# Patient Record
Sex: MALE | Race: WHITE | HISPANIC OR LATINO | Employment: UNEMPLOYED | URBAN - METROPOLITAN AREA
[De-identification: names, ages, dates, MRNs, and addresses within clinical notes are randomized per-mention and may not be internally consistent; named-entity substitution may affect disease eponyms.]

---
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
## 2019-10-23 ENCOUNTER — APPOINTMENT (OUTPATIENT)
Dept: RADIOLOGY | Facility: MEDICAL CENTER | Age: 19
End: 2019-10-23
Attending: EMERGENCY MEDICINE
Payer: COMMERCIAL

## 2019-10-23 ENCOUNTER — HOSPITAL ENCOUNTER (EMERGENCY)
Facility: MEDICAL CENTER | Age: 19
End: 2019-10-23
Attending: EMERGENCY MEDICINE
Payer: COMMERCIAL

## 2019-10-23 VITALS
HEART RATE: 62 BPM | DIASTOLIC BLOOD PRESSURE: 66 MMHG | TEMPERATURE: 98.1 F | OXYGEN SATURATION: 99 % | BODY MASS INDEX: 21.71 KG/M2 | HEIGHT: 72 IN | SYSTOLIC BLOOD PRESSURE: 112 MMHG | WEIGHT: 160.27 LBS | RESPIRATION RATE: 18 BRPM

## 2019-10-23 DIAGNOSIS — F29 PSYCHOSIS, UNSPECIFIED PSYCHOSIS TYPE (HCC): ICD-10-CM

## 2019-10-23 DIAGNOSIS — R41.82 ALTERED MENTAL STATUS, UNSPECIFIED ALTERED MENTAL STATUS TYPE: ICD-10-CM

## 2019-10-23 LAB
ALBUMIN SERPL BCP-MCNC: 4.3 G/DL (ref 3.2–4.9)
ALBUMIN/GLOB SERPL: 1.4 G/DL
ALP SERPL-CCNC: 78 U/L (ref 30–99)
ALT SERPL-CCNC: 17 U/L (ref 2–50)
AMPHET UR QL SCN: NEGATIVE
ANION GAP SERPL CALC-SCNC: 10 MMOL/L (ref 0–11.9)
APAP SERPL-MCNC: <10 UG/ML (ref 10–30)
APPEARANCE UR: CLEAR
AST SERPL-CCNC: 17 U/L (ref 12–45)
BARBITURATES UR QL SCN: NEGATIVE
BASOPHILS # BLD AUTO: 0.7 % (ref 0–1.8)
BASOPHILS # BLD: 0.06 K/UL (ref 0–0.12)
BENZODIAZ UR QL SCN: NEGATIVE
BILIRUB SERPL-MCNC: 0.3 MG/DL (ref 0.1–1.5)
BILIRUB UR QL STRIP.AUTO: NEGATIVE
BUN SERPL-MCNC: 19 MG/DL (ref 8–22)
BZE UR QL SCN: NEGATIVE
CALCIUM SERPL-MCNC: 9 MG/DL (ref 8.5–10.5)
CANNABINOIDS UR QL SCN: NEGATIVE
CHLORIDE SERPL-SCNC: 105 MMOL/L (ref 96–112)
CO2 SERPL-SCNC: 26 MMOL/L (ref 20–33)
COLOR UR: YELLOW
CREAT SERPL-MCNC: 0.8 MG/DL (ref 0.5–1.4)
EKG IMPRESSION: NORMAL
EOSINOPHIL # BLD AUTO: 0.13 K/UL (ref 0–0.51)
EOSINOPHIL NFR BLD: 1.6 % (ref 0–6.9)
ERYTHROCYTE [DISTWIDTH] IN BLOOD BY AUTOMATED COUNT: 41.1 FL (ref 35.9–50)
ETHANOL BLD-MCNC: 0 G/DL
GLOBULIN SER CALC-MCNC: 3.1 G/DL (ref 1.9–3.5)
GLUCOSE BLD-MCNC: 91 MG/DL (ref 65–99)
GLUCOSE SERPL-MCNC: 86 MG/DL (ref 65–99)
GLUCOSE UR STRIP.AUTO-MCNC: NEGATIVE MG/DL
HCT VFR BLD AUTO: 43.4 % (ref 42–52)
HGB BLD-MCNC: 14.4 G/DL (ref 14–18)
IMM GRANULOCYTES # BLD AUTO: 0.02 K/UL (ref 0–0.11)
IMM GRANULOCYTES NFR BLD AUTO: 0.2 % (ref 0–0.9)
KETONES UR STRIP.AUTO-MCNC: NEGATIVE MG/DL
LEUKOCYTE ESTERASE UR QL STRIP.AUTO: NEGATIVE
LYMPHOCYTES # BLD AUTO: 2.65 K/UL (ref 1–4.8)
LYMPHOCYTES NFR BLD: 33 % (ref 22–41)
MCH RBC QN AUTO: 29.3 PG (ref 27–33)
MCHC RBC AUTO-ENTMCNC: 33.2 G/DL (ref 33.7–35.3)
MCV RBC AUTO: 88.2 FL (ref 81.4–97.8)
METHADONE UR QL SCN: NEGATIVE
MICRO URNS: NORMAL
MONOCYTES # BLD AUTO: 0.44 K/UL (ref 0–0.85)
MONOCYTES NFR BLD AUTO: 5.5 % (ref 0–13.4)
NEUTROPHILS # BLD AUTO: 4.74 K/UL (ref 1.82–7.42)
NEUTROPHILS NFR BLD: 59 % (ref 44–72)
NITRITE UR QL STRIP.AUTO: NEGATIVE
NRBC # BLD AUTO: 0 K/UL
NRBC BLD-RTO: 0 /100 WBC
OPIATES UR QL SCN: NEGATIVE
OXYCODONE UR QL SCN: NEGATIVE
PCP UR QL SCN: NEGATIVE
PH UR STRIP.AUTO: 6 [PH] (ref 5–8)
PLATELET # BLD AUTO: 337 K/UL (ref 164–446)
PMV BLD AUTO: 9.5 FL (ref 9–12.9)
POTASSIUM SERPL-SCNC: 3.4 MMOL/L (ref 3.6–5.5)
PROPOXYPH UR QL SCN: NEGATIVE
PROT SERPL-MCNC: 7.4 G/DL (ref 6–8.2)
PROT UR QL STRIP: NEGATIVE MG/DL
RBC # BLD AUTO: 4.92 M/UL (ref 4.7–6.1)
RBC UR QL AUTO: NEGATIVE
SALICYLATES SERPL-MCNC: 0 MG/DL (ref 15–25)
SODIUM SERPL-SCNC: 141 MMOL/L (ref 135–145)
SP GR UR STRIP.AUTO: 1.02
T4 FREE SERPL-MCNC: 0.99 NG/DL (ref 0.53–1.43)
TSH SERPL DL<=0.005 MIU/L-ACNC: 0.76 UIU/ML (ref 0.38–5.33)
UROBILINOGEN UR STRIP.AUTO-MCNC: 0.2 MG/DL
WBC # BLD AUTO: 8 K/UL (ref 4.8–10.8)

## 2019-10-23 PROCEDURE — 80053 COMPREHEN METABOLIC PANEL: CPT

## 2019-10-23 PROCEDURE — 80307 DRUG TEST PRSMV CHEM ANLYZR: CPT

## 2019-10-23 PROCEDURE — 84443 ASSAY THYROID STIM HORMONE: CPT

## 2019-10-23 PROCEDURE — 70450 CT HEAD/BRAIN W/O DYE: CPT

## 2019-10-23 PROCEDURE — 99285 EMERGENCY DEPT VISIT HI MDM: CPT

## 2019-10-23 PROCEDURE — 81003 URINALYSIS AUTO W/O SCOPE: CPT

## 2019-10-23 PROCEDURE — 93005 ELECTROCARDIOGRAM TRACING: CPT | Performed by: EMERGENCY MEDICINE

## 2019-10-23 PROCEDURE — 84439 ASSAY OF FREE THYROXINE: CPT

## 2019-10-23 PROCEDURE — 82962 GLUCOSE BLOOD TEST: CPT

## 2019-10-23 PROCEDURE — 85025 COMPLETE CBC W/AUTO DIFF WBC: CPT

## 2019-10-23 NOTE — ED NOTES
Received report from Rachna FAY. Pt care responsibilities assumed. Pt resting in bed, will continue to monitor.

## 2019-10-23 NOTE — CONSULTS
"[facility]   TRIAGE ASSESSMENT    Name: Boaz Reis  MRN: [id number]  : 2000  Age: 18 y.o.  Date of assessment: 10/23/2019  PCP: No primary care provider on file.  Persons in attendance: Patient    CHIEF COMPLAINT/PRESENTING ISSUE (as stated by patient): Patient lying in bed staring at ceiling.  Nodding, smiling and speaking under his breath.  Patient's feet malodorous with multiple wounds observed.  Labile mood, alternating between smiling, laughing and then becoming tearful.  No anger or aggression noted.  Latency and poverty of speech observed. Answers questions with two to three word responses. Endorses auditory hallucinations.  Reports one voice being present. Patient unable to provide much information due to psychosis.  To remain on legal hold at this time.     Chief Complaint   Patient presents with   • ALOC     Pt presents with an ALOC, brought in by his brother. Pt is responds minimally to converstation. Does not make appropriate eye contact. Pt laughing at inapropriate times, and appeared to be reacting to internal stimuli. Pt admits to hearing voices. States that voice in \"pretty friendly\" denies command hallucinations, at this time. Pt denies psyciatric history.  Pt currently denying drug use.    • Family Problem     Pt brother was called to come pick his brother up from his fathers. Pt has not been in contact with brother in 1 year.         CURRENT LIVING SITUATION/SOCIAL SUPPORT: Patient able to provide address of residence.  Lives with father; however, unable to provide any further details.  Per triage note, patient brought in by brother.  Unknown social support other than brother and father at this time.      BEHAVIORAL HEALTH TREATMENT HISTORY  Does patient/parent report a history of prior behavioral health treatment for patient?   No:  Patient denies behavioral health treatment history.  Denies taking psychiatric medications.      SAFETY ASSESSMENT - SELF  Does patient " "acknowledge current or past symptoms of dangerousness to self? no  Does parent/significant other report patient has current or past symptoms of dangerousness to self? N\A  Does presenting problem suggest symptoms of dangerousness to self? No- however, patient unable to take care of self at this time.      SAFETY ASSESSMENT - OTHERS  Does patient acknowledge current or past symptoms of aggressive behavior or risk to others? no  Does parent/significant other report patient has current or past symptoms of aggressive behavior or risk to others?  N\A  Does presenting problem suggest symptoms of dangerousness to others? No    Crisis Safety Plan completed and copy given to patient? no    ABUSE/NEGLECT SCREENING  Does patient report feeling “unsafe” in his/her home, or afraid of anyone?  no  Does patient report any history of physical, sexual, or emotional abuse?  no  Does parent or significant other report any of the above? N\A  Is there evidence of neglect by self?  no  Is there evidence of neglect by a caregiver? no  Does the patient/parent report any history of CPS/APS/police involvement related to suspected abuse/neglect or domestic violence? no  Based on the information provided during the current assessment, is a mandated report of suspected abuse/neglect being made?  No    SUBSTANCE USE SCREENING  Yes:  Arjan all substances used in the past 30 days: Denies      Last Use Amount   []   Alcohol     []   Marijuana     []   Heroin     []   Prescription Opioids  (used without prescription, for    recreation, or in excess of prescribed amount)     []   Other Prescription  (used without prescription, for    recreation, or in excess of prescribed amount)     []   Cocaine      []   Methamphetamine     []   \"\" drugs (ectasy, MDMA)     []   Other substances        UDS results: Pending  Breathalyzer results: 0.0    What consequences does the patient associate with any of the above substance use and or addictive behaviors? " None    Risk factors for detox (check all that apply):  []  Seizures   []  Diaphoretic (sweating)   []  Tremors   []  Hallucinations   []  Increased blood pressure   []  Decreased blood pressure   []  Other   []  None      [] Patient education on risk factors for detoxification and instructed to return to ER as needed.      MENTAL STATUS   Participation: Limited verbal participation  Grooming: Disheveled  Orientation: Alert and Evidence of hallucinations present  Behavior: Unusual behaviors noted  Eye contact: Poor  Mood: Labile  Affect: Labile -between happy and sad  Thought process: unable to assess  Thought content: Preoccupation-auditory hallucinations  Speech: Soft, Hypotalkative and Latency of response  Perception: Evidence of auditory hallucination  Memory:  Poor memory for chronology of events  Insight: Poor  Judgment:  Poor  Other:    Collateral information:   Source:  [] Significant other present in person:   [] Significant other by telephone  [] [facility]   [x] [facility] Nursing Staff  [x] [facility]  [x] Other: ERP    [x] Unable to complete full assessment due to:  [] Acute intoxication  [] Patient declined to participate/engage  [] Patient verbally unresponsive  [] Significant cognitive deficits  [x] Significant perceptual distortions or behavioral disorganization  [] Other:      CLINICAL IMPRESSIONS:  Primary:  Psychosis  Secondary:         IDENTIFIED NEEDS/PLAN:  [Trigger DISPOSITION list for any items marked]    []  Imminent safety risk - self [] Imminent safety risk - others   []  Acute substance withdrawal [x]  Psychosis/Impaired reality testing   []  Mood/anxiety []  Substance use/Addictive behavior   []  Maladaptive behaviro []  Parent/child conflict   []  Family/Couples conflict []  Biomedical   []  Housing []  Financial   []   Legal  Occupational/Educational   []  Domestic violence []  Other:     Disposition: Refer to Legal Hold    Does patient express agreement with the above  plan? no    Referral appointment(s) scheduled? N\A    Alert team only:   I have discussed findings and recommendations with Dr. Cruz who is in agreement with these recommendations.     Referral information sent to the following community providers :    If applicable : Referred  to : [facility] for legal hold follow up at (time): Pending ERP certification      Edmund Garcai R.N.  10/23/2019

## 2019-10-23 NOTE — ED TRIAGE NOTES
"Boaz Reis  Chief Complaint   Patient presents with   • ALOC     Pt presents with an ALOC, brought in by his brother. Pt is responds minimally to converstation. Does not make appropriate eye contact. Pt laughing at inapropriate times, and appeared to be reacting to internal stimuli. Pt admits to hearing voices. States that voice in \"pretty friendly\" denies command hallucinations, at this time. Pt denies psyciatric history.  Pt currently denying drug use.    • Family Problem     Pt brother was called to come pick his brother up from his fathers. Pt has not been in contact with brother in 1 year.      Pt ambulatory to triage with above complaint.     /64   Pulse 74   Temp 36.8 °C (98.2 °F) (Temporal)   Resp 16   Ht 1.829 m (6')   Wt 72.7 kg (160 lb 4.4 oz)   SpO2 96%   BMI 21.74 kg/m²       "

## 2019-10-23 NOTE — DISCHARGE PLANNING
Medical Social Work    Referral: Legal hold Transfer to Mental Health Facility    Intervention: LIZ received call from Rip at [facility] stating that they have accepted the patient for admission.     Pt's accepting physcian is Dr. Loyda HILL arranged for transportation to be set up [facility]    The pt will be picked up at 1000.     LIZ notified the RN of the departure time as well as accepting facility.     LIZ created transfer packet and placed on chart.     Plan: Pt will transfer back to [facility] at 1000

## 2019-10-23 NOTE — ED PROVIDER NOTES
"                                                                          ED Provider Note    Scribed for Darion Cruz M.D. by Vicenta Pal. 10/23/2019  2:17 AM    CHIEF COMPLAINT  Chief Complaint   Patient presents with   • ALOC     Pt presents with an ALOC, brought in by his brother. Pt is responds minimally to converstation. Does not make appropriate eye contact. Pt laughing at inapropriate times, and appeared to be reacting to internal stimuli. Pt admits to hearing voices. States that voice in \"pretty friendly\" denies command hallucinations, at this time. Pt denies psyciatric history.  Pt currently denying drug use.    • Family Problem     Pt brother was called to come pick his brother up from his fathers. Pt has not been in contact with brother in 1 year.        HPI  Boaz Reis is a 18 y.o. male who presents to the ED for psych evalaution. Per nursing note, Patient's mother states that patient has been \"out of the norm\" lately, staring off into space for long periods of time. She states that patient becomes unresponsive and doesn't talk as much. Patient does not make appropriate eye contact, laughs at inappropriate times, and appears to react to internal stimuli. Patient admits hearing voices, which are \"friendly.\" Denies following commands of these voices. Denies drug use. Patient has no prior medical history or psychological history.    Patient has not been in contact with his brother in 1 year. According to patient's brother, patient has been \"out on the streets for a few days.\" He was taken to [facility], but was sent to the ED for further evaluation before admitting.     Unobtainable HPI limited secondary to ALOC    REVIEW OF SYSTEMS  Constitutional: No fevers, chills, or recent illness.  Skin: No rashes or diaphoresis.  Eyes: No change in vision, no discharge.  ENT: No hearing change. No rhinorrhea or nasal congestion, no ST or difficulty swallowing.  Respiratory: No SOB. No coughing or " hemoptysis. No Wheezing.  Cardiac: No CP, palpitations, edema. No PND or orthopnea.  GI: No Abdominal Pain; N/V; diarrhea, constipation. No blood in stool.  : No dysuria. No D/C. No frequency or urgency. No hesitancy.   MSK: No pain in joints or muscles. No calf pain or swelling.  Neuro: No HA or paresthesias. No focal weakness.  Psych: No SI, HI, AH, VH.  Endocrine: No polyuria or polydipsia. No heat or cold intolerance.  Heme: No easy bruising. No history of bleeding disorders or anemia.    Unobtainable ROS limited secondary to ALOC    PAST MEDICAL HISTORY       SOCIAL HISTORY  Social History     Tobacco Use   • Smoking status: Current Some Day Smoker     Types: Cigarettes   • Smokeless tobacco: Never Used   Substance and Sexual Activity   • Alcohol use: Yes     Comment: occ   • Drug use: No   • Sexual activity: Never       SURGICAL HISTORY  patient denies any surgical history    CURRENT MEDICATIONS  No current facility-administered medications on file prior to encounter.      No current outpatient medications on file prior to encounter.     ALLERGIES  No Known Allergies    PHYSICAL EXAM  VITAL SIGNS: /64   Pulse 74   Temp 36.8 °C (98.2 °F) (Temporal)   Resp 16   Ht 1.829 m (6')   Wt 72.7 kg (160 lb 4.4 oz)   SpO2 96%   BMI 21.74 kg/m²    Pulse ox interpretation: I interpret this pulse ox as normal.  Genl: Disheveled appearing young male, nontoxic.   Head: NC/AT   ENT: Dry MM,posterior pharynx clear, uvula midline, nares patent bilaterally   Eyes: Normal sclera, pupils equal round reactive to light  Neck: Supple, FROM, no LAD appreciated   Pulmonary: Lungs are clear to auscultation bilaterally  Chest: No TTP  CV:  RRR, no murmur appreciated, pulses 2+ in both upper and lower extremities,  Abdomen: soft, NT/ND; no rebound/guarding, no masses palpated, no HSM   : no CVA or suprapubic tenderness   Musculoskeletal: Pain free ROM of the neck. Moving upper and lower extremities and spontaneous in  coordinated fashion  Neuro: A&Ox4 (person, place, time, situation), speech fluent, gait steady, no focal deficits appreciated, No cerebellar signs  Sensation is grossly intact in the distal upper and lower extremities  5/5 strength in  and dorsiflexion/plantar flexion of the ankles  Psych: Alert and oriented. Non linear thought process. Quiet speech. Intermittent and inappropriate laugher. Responds to internal stimuli. Psychomotor agitation of upper extremities. Consistent head nodding and blinking.  Skin:weathered, tanned, and wet skin break on bilateral feet.     DIAGNOSTIC STUDIES / PROCEDURES    EKG  Results for orders placed or performed during the hospital encounter of 10/23/19   EKG (NOW)   Result Value Ref Range    Report       [facility] Emergency Dept[facility]    Test Date:  2019-10-23  Pt Name:    KEVIN RIZO                 Department: ER  MRN:        [id number]                      Room:       [facility]  Gender:     Male                         Technician: [id number]  :        2000                   Requested By:MANNIE MIKE  Order #:    [id number]                    Reading MD: Anthony Orlando MD    Measurements  Intervals                                Axis  Rate:       54                           P:          47  ND:         140                          QRS:        84  QRSD:       100                          T:          56  QT:         444  QTc:        421    Interpretive Statements  SINUS BRADYCARDIA  PROBABLE LEFT VENTRICULAR HYPERTROPHY  No previous ECG available for comparison    Electronically Signed On 10- 2:56:40 PDT by Anthony Orlando MD           LABS  Labs Reviewed   CBC WITH DIFFERENTIAL - Abnormal; Notable for the following components:       Result Value    MCHC 33.2 (*)     All other components within normal limits   COMP METABOLIC PANEL - Abnormal; Notable for the following components:    Potassium 3.4 (*)     All other components within normal limits   SALICYLATE -  Abnormal; Notable for the following components:    Salicylates, Quant. 0 (*)     All other components within normal limits   DIAGNOSTIC ALCOHOL   ACETAMINOPHEN   URINALYSIS,CULTURE IF INDICATED   URINE DRUG SCREEN   TSH   FREE THYROXINE   ESTIMATED GFR   ACCU-CHEK GLUCOSE     All labs reviewed by me.    RADIOLOGY  CT-HEAD W/O   Final Result         1.  No acute intracranial abnormality.   2.  Severe bilateral maxillary sinusitis changes      Radiology reviewed by me     COURSE & MEDICAL DECISION MAKING  Pertinent Labs & Imaging studies reviewed. (See chart for details)    Differential diagnoses include but not limited to:   Psychiatric illness  Psychosis  Intercranial abnormality  Cranial Mass  Thyroid Dysfunction  Hypo/hyper gylcemia   Electrolyte abnormality  Substance abuse  Alcohol intoxication.     2:17 AM - Patient seen and examined at bedside. Patient will be treated with IV fluids. CT-head, Urinalysis, Urine drug screen, TSH, Free thyroxine, blood alcohol, CBC with diff., CMP, Acetaminophen Level, Salicylate level, EKG to evaluate his symptoms.     HYDRATION: Based on the patient's presentation of Dehydration the patient was given IV fluids. IV Hydration was used because oral hydration was not adequate alone. Upon recheck following hydration, the patient was improved.    Medical Decision Making:   Presented to the emergency room for symptoms as described above.  On my initial evaluation he appears to be suffering from an acute psychosis and based on his age and family history schizophrenia is the likely leading diagnosis.  Bedside blood sugar is normal.  As he has not been previously evaluated a broad medical work-up with the broad differential as noted above is considered.  He has no evidence of hypo-or hyperthyroidism, he has no focal neurological deficits or signs of increased intracranial pressure.  CT of the head is ordered to rule out any possible masses or other structural abnormalities.  There are no  gross metabolic derangements, no evidence of acute infectious etiology and his drug screen panel is negative, no elevated alcohol.  No signs of salicylate or acetaminophen toxicity.  I discussed my concerns with the behavioral health consultant and he is agreeable my concerns regarding likely acute psychosis and psychiatric illness.  The patient is unable to care for self.  Legal hold is placed and the patient is medically clear for transfer    FINAL IMPRESSION  Visit Diagnoses     ICD-10-CM   1. Altered mental status, unspecified altered mental status type R41.82   2. Psychosis, unspecified psychosis type (HCC) F29     Vicenta DUARTE (Scribana maria), am scribing for, and in the presence of, Darion Cruz M.D..    Electronically signed by: Vicenta Pal (Fco), 10/23/2019    Darion DUARTE M.D. personally performed the services described in this documentation, as scribed by Vicenta Pal in my presence, and it is both accurate and complete.    C    The note accurately reflects work and decisions made by me.  Darion Cruz  10/23/2019  6:28 AM

## 2019-10-23 NOTE — ED NOTES
Pt asleep on gurney; Pt room in direct view of RN, Pt respirations are equal and non labored.     RN will continue to monitor.

## 2019-10-23 NOTE — ED NOTES
Pt presents to the ED with family for complaints of activity out of the norm for the patinet. Pt family states that he has been unresponsive and staring off at objects for long periods of time. Pt family denies any medical hx or hx of psychosis. Pt is unresponsive to quesitons. Pt is sitting on gurney shaking head up and down occasionally making hand gestures.

## 2019-10-23 NOTE — DISCHARGE PLANNING
Medical Social Work    Referral: Legal Hold    Intervention: Legal Hold Paperwork given to SW by [employer] RN Edmund Garcia    Legal Hold Initiated: Date: 10/23/19 Time: 0343    Patient’s Insurance Listed on Face Sheet: [facility]    Referrals sent to: [facility], [place], [facility], [facility]    This referral contains the following information:  1) Face sheet _x___  2) Page 1 and Page 2 of Legal Hold __x__  3) Alert Team Assessment/Psych Assessment __x__  4) Head to toe physical exam _x___  5) Urine Drug Screen _x___  6) Blood Alcohol __x__  7) Vital signs __x__  8) Pregnancy test when applicable _n/a__  9) Medications list __x__    Plan: Patient will transfer to mental health facility once acceptance is obtained.

## 2019-10-23 NOTE — ED NOTES
Report given to Axel RN; Pt transported to [place] [id number]; Pt updated on plan of care, pt has no questions at this time.

## 2020-06-02 ENCOUNTER — HOSPITAL ENCOUNTER (EMERGENCY)
Facility: MEDICAL CENTER | Age: 20
End: 2020-06-03
Attending: EMERGENCY MEDICINE
Payer: COMMERCIAL

## 2020-06-02 DIAGNOSIS — F20.0 SCHIZOPHRENIA, PARANOID, CHRONIC WITH ACUTE EXACERBATION (HCC): ICD-10-CM

## 2020-06-02 LAB
AMPHET UR QL SCN: NEGATIVE
BARBITURATES UR QL SCN: NEGATIVE
BENZODIAZ UR QL SCN: NEGATIVE
BZE UR QL SCN: NEGATIVE
CANNABINOIDS UR QL SCN: NEGATIVE
METHADONE UR QL SCN: NEGATIVE
OPIATES UR QL SCN: NEGATIVE
OXYCODONE UR QL SCN: NEGATIVE
PCP UR QL SCN: NEGATIVE
POC BREATHALIZER: 0 PERCENT (ref 0–0.01)
PROPOXYPH UR QL SCN: NEGATIVE

## 2020-06-02 PROCEDURE — 302970 POC BREATHALIZER: Performed by: EMERGENCY MEDICINE

## 2020-06-02 PROCEDURE — 302970 POC BREATHALIZER

## 2020-06-02 PROCEDURE — 90791 PSYCH DIAGNOSTIC EVALUATION: CPT

## 2020-06-02 PROCEDURE — 99284 EMERGENCY DEPT VISIT MOD MDM: CPT

## 2020-06-02 PROCEDURE — 80307 DRUG TEST PRSMV CHEM ANLYZR: CPT

## 2020-06-02 ASSESSMENT — FIBROSIS 4 INDEX: FIB4 SCORE: 0.24

## 2020-06-03 VITALS
BODY MASS INDEX: 23.7 KG/M2 | RESPIRATION RATE: 15 BRPM | OXYGEN SATURATION: 98 % | TEMPERATURE: 98.6 F | WEIGHT: 175 LBS | SYSTOLIC BLOOD PRESSURE: 130 MMHG | HEART RATE: 85 BPM | DIASTOLIC BLOOD PRESSURE: 71 MMHG | HEIGHT: 72 IN

## 2020-06-03 NOTE — ED TRIAGE NOTES
Boaz Reis   19 y.o. male   Chief Complaint   Patient presents with   • Psych Eval      The patient presents to the ED via EMS for a psychiatric evaluation. Per report, the patient has a history of schizophrenia that was diagnosed approximately one year ago. Unknown to the patient's family, he recently stopped taking his medications because he felt better. The patient was transported because he was acting erratic and being verbally aggressive with family members. The patient denies SI, HI or any visual auditory or tactile hallucinations, although he does have a history of audio/visual.     Denies alcohol or drug use this evening.    For this reason, the patient presents for evaluation.

## 2020-06-03 NOTE — CONSULTS
"[facility]   TRIAGE ASSESSMENT    Name: Boaz Reis  MRN: [id number]  : 2000  Age: 19 y.o.  Date of assessment: 2020  PCP: No primary care provider on file.  Persons in attendance: Patient    CHIEF COMPLAINT/PRESENTING ISSUE (as stated by Patient, ER RN, ERP):   Patient is a 20 y/o male BIB EMS after family concerned about patient's increasing erratic behaviors and recent verbal aggression.  Patient diagnosed with Schizophrenia this past year. Patient had disclosed to family that he had stopped his medications because he was feeling better. Patient unable to identify medication regime, timeframe since discontinuing medications or name of Psychiatrist. Attempted multiple times to contact family for collateral information but phone number not in service at present. Patient is oriented to person, place and situation, \"I'm here cause I was acting out. I get easily frustrated... anxious.\" Patient presents with incongruent affect, labile mood, poor eye contact with one word responses. Patient denies any SI/HI or AVH but responds to internal and external stimuli throughout evaluation. Patient's UDS and Breathalyzer is negative. Patient would benefit further psychiatric stabilization while restarting medication regime.  Chief Complaint   Patient presents with   • Psych Eval        CURRENT LIVING SITUATION/SOCIAL SUPPORT: Patient lives with parents. Unknown social support; unable to contact family. Patient is unemployed and unable to vocalize [facility] providers.     BEHAVIORAL HEALTH TREATMENT HISTORY  Does patient/parent report a history of prior behavioral health treatment for patient?   Yes:    Dates Level of Care Facilty/Provider Diagnosis/Problem Medications   10/2019 [facility] Psychosis                             SAFETY ASSESSMENT - SELF  Does patient acknowledge current or past symptoms of dangerousness to self? no  Does parent/significant other report patient has current or past symptoms of " "dangerousness to self? N\A  Does presenting problem suggest symptoms of dangerousness to self? No    SAFETY ASSESSMENT - OTHERS  Does patient acknowledge current or past symptoms of aggressive behavior or risk to others? no  Does parent/significant other report patient has current or past symptoms of aggressive behavior or risk to others?  N\A  Does presenting problem suggest symptoms of dangerousness to others? No    Crisis Safety Plan completed and copy given to patient? N\A    ABUSE/NEGLECT SCREENING  Does patient report feeling “unsafe” in his/her home, or afraid of anyone?  no  Does patient report any history of physical, sexual, or emotional abuse?  no  Does parent or significant other report any of the above? N\A  Is there evidence of neglect by self?  no  Is there evidence of neglect by a caregiver? no  Does the patient/parent report any history of CPS/APS/police involvement related to suspected abuse/neglect or domestic violence? no  Based on the information provided during the current assessment, is a mandated report of suspected abuse/neglect being made?  No    SUBSTANCE USE SCREENING  Yes:  Rajan all substances used in the past 30 days: Denies any alcohol or substance use      Last Use Amount   []   Alcohol     []   Marijuana     []   Heroin     []   Prescription Opioids  (used without prescription, for    recreation, or in excess of prescribed amount)     []   Other Prescription  (used without prescription, for    recreation, or in excess of prescribed amount)     []   Cocaine      []   Methamphetamine     []   \"\" drugs (ectasy, MDMA)     []   Other substances        UDS results: negative  Breathalyzer results: 0.00    What consequences does the patient associate with any of the above substance use and or addictive behaviors? None    Risk factors for detox (check all that apply):  []  Seizures   []  Diaphoretic (sweating)   []  Tremors   []  Hallucinations   []  Increased blood pressure   []  " Decreased blood pressure   []  Other   []  None      [] Patient education on risk factors for detoxification and instructed to return to ER as needed.      MENTAL STATUS   Participation: Limited verbal participation  Grooming: Casual  Orientation: Evidence of delusions present and Evidence of hallucinations present  Behavior: Unusual behaviors noted  Eye contact: Poor  Mood: Anxious  Affect: Incongruent with content  Thought process: Tangential  Thought content: Preoccupation  Speech: Hypotalkative and Latency of response  Perception: Evidence of auditory hallucination and Evidence of hallucination  Memory:  Poor memory for chronology of events  Insight: Poor  Judgment:  Poor  Other:    Collateral information:   Source:  [] Significant other present in person:   [] Significant other by telephone  [] [facility]   [x] [facility] Nursing Staff  [x] [facility] Medical Record  [x] Other: ERP  [] Unable to complete full assessment due to:  [] Acute intoxication  [] Patient declined to participate/engage  [] Patient verbally unresponsive  [] Significant cognitive deficits  [] Significant perceptual distortions or behavioral disorganization  [x] Other: N/A     CLINICAL IMPRESSIONS:  Primary: Schizophrenia  Secondary:  Medication noncompliance       IDENTIFIED NEEDS/PLAN:  [Trigger DISPOSITION list for any items marked]    []  Imminent safety risk - self [] Imminent safety risk - others   []  Acute substance withdrawal [x]  Psychosis/Impaired reality testing   []  Mood/anxiety []  Substance use/Addictive behavior   []  Maladaptive behaviro []  Parent/child conflict   []  Family/Couples conflict []  Biomedical   []  Housing []  Financial   []   Legal  Occupational/Educational   []  Domestic violence []  Other:     Disposition: Actively being addressed by Legal [facility] Emergency Department, [facility] and [facility]    Does patient express agreement with the above plan? yes    Referral  appointment(s) scheduled? N\A    Alert team only: Patient presents w/ psychosis increasing since stopping medication regime, family reports unknown timeframe but likely contributing to erratic behavior and verbal aggression toward family. Patient would benefit further psychiatric stabilization.     I have discussed findings and recommendations with Dr. Jansen who is in agreement with these recommendations.     Referral information sent to the following community providers : [facility], [facility]    If applicable : Referred  to : [facility] for legal hold follow up at 12:00 am      Debra Saez R.N.  6/2/2020

## 2020-06-03 NOTE — DISCHARGE PLANNING
Medical Social Work    Referral: Legal hold Transfer to Mental Health Facility    Intervention: LIZ received call from Ibeth at [facility] stating that Dr. YINKA Quintana has accepted the patient for admission.  Ibeth requested that transport be arranged for 0200.    LIZ arranged for transportation to be set up through [place] with [facility].    The pt will be picked up at 0200.     LIZ notified the RN of the departure time as well as accepting facility.     LIZ created transfer packet and placed on chart. Original Legal Hold placed in packet.     Plan: Pt will transfer to [facility] at 0200.

## 2020-06-03 NOTE — ED PROVIDER NOTES
ED Provider Note    ED Provider Note    Scribed for Ирина De La Cruz MD by Ирина De La Cruz M.D.. 6/2/2020, 10:00 PM.    Primary care provider: No primary care provider on file.  Means of arrival: EMS  History obtained from: Patient  History limited by: Clinical condition    CHIEF COMPLAINT  Chief Complaint   Patient presents with   • Psych Eval       HPI  Boaz Reis is a 19 y.o. male who presents to the Emergency Department for evaluation of psychotic type symptoms.  Patient has unfortunate medical history of schizophrenia, he tells me he has been off his medications for quite some time because he had been feeling better.  Patient is a limited historian, he notes he has been more agitated and acting violently though denies any homicidal ideation.  Denies any suicidal ideation.  I am told by EMS he was acting erratically around family and they called 911 so he may be evaluated.  Patient does appear to be responding to internal stimuli, he is rather rapidly looking around the room and appears to be experiencing likely auditory hallucinations.  Denies a fever, he is not anticoagulated, not currently on any medications.  Denies any problems with substance abuse, denies any toxic ingestions, he is not a smoker.    REVIEW OF SYSTEMS  Pertinent positives include aggressive behavior, responding to internal stimuli, history of schizophrenia and medication noncompliance. Pertinent negatives include no fever, no cough.  Review of systems is otherwise somewhat limited given his active psychosis.    PAST MEDICAL HISTORY   Schizophrenia    SURGICAL HISTORY  patient denies any surgical history    SOCIAL HISTORY  Social History     Tobacco Use   • Smoking status: Current Some Day Smoker     Types: Cigarettes   • Smokeless tobacco: Never Used   Substance Use Topics   • Alcohol use: Yes     Comment: occ   • Drug use: No      Social History     Substance and Sexual Activity   Drug Use No       FAMILY HISTORY  Family  History   Problem Relation Age of Onset   • Hypertension Father        CURRENT MEDICATIONS  Home Medications     Reviewed by Tri Gann (Pharmacy Tech) on 06/02/20 at 2325  Med List Status: Unable to Obtain   Medication Last Dose Status        Patient Sumeet Taking any Medications                       ALLERGIES  No Known Allergies    PHYSICAL EXAM  VITAL SIGNS: /82   Pulse 88   Temp 37.2 °C (99 °F) (Tympanic)   Resp 16   Ht 1.829 m (6')   Wt 79.4 kg (175 lb)   SpO2 98%   BMI 23.73 kg/m²     General: Alert, no acute distress  Skin: Warm, dry, normal for ethnicity  Head: Normocephalic, atraumatic  Neck: Trachea midline, no tenderness  Eye: PERRL, normal conjunctiva, extraocular movements intact.  ENMT: Oral mucosa moist, no pharyngeal erythema or exudate  Cardiovascular: Regular rate and rhythm, No murmur, Normal peripheral perfusion, no peripheral edema.  Respiratory: Lungs CTA, respirations are non-labored, breath sounds are equal  Musculoskeletal: No swelling, no deformity  Neurological: Alert and oriented to person, place, time, and situation  Lymphatics: No cervical lymphadenopathy  Psychiatric: Cooperative, mildly anxious affect, mood congruent.  Patient is responding to internal stimuli, endorses auditory hallucinations.  Denies suicidal ideation.      DIAGNOSTIC STUDIES/PROCEDURES    LABS  Results for orders placed or performed during the hospital encounter of 06/02/20   URINE DRUG SCREEN   Result Value Ref Range    Amphetamines Urine Negative Negative    Barbiturates Negative Negative    Benzodiazepines Negative Negative    Cocaine Metabolite Negative Negative    Methadone Negative Negative    Opiates Negative Negative    Oxycodone Negative Negative    Phencyclidine -Pcp Negative Negative    Propoxyphene Negative Negative    Cannabinoid Metab Negative Negative   POC BREATHALIZER   Result Value Ref Range    POC Breathalizer 0 0.00 - 0.01 Percent     All labs reviewed by me.      COURSE &  MEDICAL DECISION MAKING  Pertinent Labs & Imaging studies reviewed. (See chart for details)    10:00 PM - Patient seen and examined at bedside. Patient will be assessed by behavioral health evaluator. Ordered urine tox and point-of-care breathalyzer to evaluate his symptoms. The differential diagnoses include but are not limited to: Acute psychosis, exacerbation of chronic schizophrenia    2215: Labs are unremarkable, given unremarkable exam patient is medically cleared at this time, awaiting behavioral health evaluation.    2315: I spoke with the behavioral health evaluator who concurs patient is certainly a candidate for legal hold, clearly unable to care for self given severity of his schizophrenia, active delusions and disorganized state, clearly responding to multiple internal stimuli.    Decision Making:  This is a 19 y.o. year old male who presents with symptoms consistent with psychosis.  Chart review demonstrates history schizophrenia, patient endorses medication noncompliance.  Patient has been acting rather bizarrely per family, I am concerned given the severity of his symptoms for his ability to care for himself and I have initiated legal hold, behavioral health to evaluate for potential transfer to psychiatric facility.  Medically cleared.  On legal hold, plan is for transfer to inpatient psychiatric unit given severity of his psychotic symptoms.    Medically cleared, patient in stable condition and disposition is transfer to inpatient psychiatric facility.    FINAL IMPRESSION  1. Schizophrenia, paranoid, chronic with acute exacerbation (HCC)          Ирина DUARTE M.D. (Scribe), am scribing for, and in the presence of, Ирина De La Cruz MD.    Electronically signed by: Ирина De La Cruz M.D. (Scribe), 6/2/2020    Ирина DUARTE MD personally performed the services described in this documentation, as scribed by Ирина De La Cruz M.D. in my presence, and it is both  accurate and complete    The note accurately reflects work and decisions made by me.  Ирина De La Cruz M.D.  6/2/2020  10:33 PM

## 2020-06-03 NOTE — DISCHARGE PLANNING
Medical Social Work    Referral: Legal Hold    Intervention: Legal Hold Paperwork given to SW by [employer] RN: Debra    Legal Hold Initiated: Date: 06/02/2020  Time: 2201    Legal Hold faxed: Date: 06/02/2020  Time: 3173    Patient’s Insurance Listed on Face Sheet: [facility]    Referrals sent to: [facility] and [facility]    Plan: Patient will transfer to mental health facility once acceptance is obtained.

## 2020-06-03 NOTE — ED NOTES
Med rec is not obtainable at this time . Pt is not juliocesar to participate in an interview. No home pharmacy listed.. phone number for family is disconnected.

## 2020-06-09 ENCOUNTER — HOSPITAL ENCOUNTER (EMERGENCY)
Facility: MEDICAL CENTER | Age: 20
End: 2020-06-10
Attending: EMERGENCY MEDICINE
Payer: COMMERCIAL

## 2020-06-09 DIAGNOSIS — R45.850 HOMICIDAL IDEATION: ICD-10-CM

## 2020-06-09 DIAGNOSIS — R44.3 HALLUCINATIONS: ICD-10-CM

## 2020-06-09 PROCEDURE — 99285 EMERGENCY DEPT VISIT HI MDM: CPT

## 2020-06-09 PROCEDURE — 302970 POC BREATHALIZER: Performed by: EMERGENCY MEDICINE

## 2020-06-09 RX ORDER — HALOPERIDOL 5 MG/1
5 TABLET ORAL ONCE
Status: COMPLETED | OUTPATIENT
Start: 2020-06-10 | End: 2020-06-10

## 2020-06-09 ASSESSMENT — FIBROSIS 4 INDEX: FIB4 SCORE: 0.24

## 2020-06-10 VITALS
HEIGHT: 72 IN | TEMPERATURE: 97.5 F | OXYGEN SATURATION: 97 % | BODY MASS INDEX: 23.7 KG/M2 | WEIGHT: 175 LBS | DIASTOLIC BLOOD PRESSURE: 71 MMHG | RESPIRATION RATE: 18 BRPM | HEART RATE: 88 BPM | SYSTOLIC BLOOD PRESSURE: 118 MMHG

## 2020-06-10 PROCEDURE — A9270 NON-COVERED ITEM OR SERVICE: HCPCS | Performed by: EMERGENCY MEDICINE

## 2020-06-10 PROCEDURE — 80307 DRUG TEST PRSMV CHEM ANLYZR: CPT

## 2020-06-10 PROCEDURE — 90791 PSYCH DIAGNOSTIC EVALUATION: CPT

## 2020-06-10 PROCEDURE — 700102 HCHG RX REV CODE 250 W/ 637 OVERRIDE(OP): Performed by: EMERGENCY MEDICINE

## 2020-06-10 RX ADMIN — HALOPERIDOL 5 MG: 5 TABLET ORAL at 00:15

## 2020-06-10 NOTE — DISCHARGE PLANNING
Medical Social Work    Referral: Legal Hold    Intervention: Legal Hold Paperwork given to SW by [employer] RN: Debra    Legal Hold Initiated: Date: 06/09/2020  Time: 4469    Legal Hold faxed: Date: 06/10/2020  Time: 3644    Patient’s Insurance Listed on Face Sheet: [facility]    Referrals sent to: [facility].  Pt was just D/C'd from [facility] yesterday.    Plan: Patient will transfer to mental health facility once acceptance is obtained.

## 2020-06-10 NOTE — DISCHARGE PLANNING
Medical Social Work    MSW spoke with [facility] supervisor who states that they are running behind on transport but will come  pt around 0430.  MSW updated ER Charge and bedside RNJustin Pro at [facility] was also updated.

## 2020-06-10 NOTE — ED PROVIDER NOTES
ED Provider Note    CHIEF COMPLAINT  Chief Complaint   Patient presents with   • Homicidal Ideation     pt to ED34 c/o HI x 2months denies SI at this time       HPI  Boaz Reis is a 19 y.o. male who presents to the emergency department chief complaint of homicidal ideation.  The patient has been seen here before for some anxiety and hallucinations with delusions he was just at [place] over the last week where he is been taking Haldol but he states he is had worsening thoughts over the last 2 months of wanting to harm others due to some violent hallucinations and delusions.  He states he got very aggressive tonight and he felt like he might harm someone if he came across anybody looked at him wrong.  He still feels like he might harm someone else but denies wanting to harm himself.  He denies any drug or alcohol use.    REVIEW OF SYSTEMS  Positives as above. Pertinent negatives include chest pain shortness of breath fevers chills cough suicidal ideation  All other review of systems are negative    PAST MEDICAL HISTORY       SOCIAL HISTORY  Social History     Tobacco Use   • Smoking status: Current Some Day Smoker     Types: Cigarettes   • Smokeless tobacco: Never Used   Substance and Sexual Activity   • Alcohol use: Yes     Comment: occ   • Drug use: Yes     Comment: meth    • Sexual activity: Never       SURGICAL HISTORY  patient denies any surgical history    CURRENT MEDICATIONS  Home Medications     Reviewed by Ernestine Linares R.N. (Registered Nurse) on 06/09/20 at 2345  Med List Status: Not Addressed   Medication Last Dose Status        Patient Sumeet Taking any Medications                       ALLERGIES  No Known Allergies    PHYSICAL EXAM  VITAL SIGNS: /82   Pulse 96   Temp 36.4 °C (97.5 °F) (Oral)   Resp 14   Ht 1.829 m (6')   Wt 79.4 kg (175 lb)   SpO2 96%   BMI 23.73 kg/m²    Pulse ox interpretation: I interpret this pulse ox as normal.  Constitutional: Alert in no apparent distress.  HENT:  Normocephalic atraumatic, MMM  Eyes: PER, Conjunctiva normal, Non-icteric.   Neck: Normal range of motion, No tenderness, Supple, No stridor.   Cardiovascular: Regular rate and rhythm, no murmurs.   Thorax & Lungs: Normal breath sounds, No respiratory distress, No wheezing, No chest tenderness.   Abdomen: Bowel sounds normal, Soft, No tenderness, No pulsatile masses. No peritoneal signs.  Skin: Warm, Dry, No erythema, No rash.   Back: No bony tenderness, No CVA tenderness.   Extremities/MSK: Intact distal pulses, No edema, No tenderness, No cyanosis, no major deformities noted  Neurologic: Alert and oriented x3, No focal deficits noted.   Psychiatric: Flat affect while flicking his eyes across the room at least responding to some internal stimuli, poor insight into his condition with homicidal ideation denies suicidal ideation      DIFFERENTIAL DIAGNOSIS AND WORK UP PLAN    This is a 19 y.o. male who presents with homicidal ideation flat affect and poor insight into his disease process.  He is resting comfortably I see no signs or symptoms that he this is secondary to a medical cause especially as he is been here before for similar complaints.  Will evaluate his alcohol intoxication have behavioral health evaluate him for likely placement for his homicidal ideation and delusions and hallucinations    DIAGNOSTIC STUDIES / PROCEDURES    LABS  Pertinent Lab Findings    Labs Reviewed   POC BREATHALIZER - Normal   URINE DRUG SCREEN       RADIOLOGY  No orders to display     The radiologist's interpretation of all radiological studies have been reviewed by me.      COURSE & MEDICAL DECISION MAKING  Pertinent Labs & Imaging studies reviewed. (See chart for details)    Spoke mya Richardson with life skills who will assess the patient at the bedside - concerning hx of HI and poor insight into his illness  Will give the patient 5mg oral haldol     I believe the patient would be a good candidate for inpatient treatment fascially in  light of thoughts of hurting any random stranger without anyone specific target and with poor insight and delusions and hallucinations which are urging him to hurt others I do believe he is at risk to others    I verified that the patient was wearing a mask and I was wearing appropriate PPE every time I entered the room. The patient's mask was on the patient at all times during my encounter except for a brief view of the oropharynx.        FINAL IMPRESSION  1. Homicidal ideation     2. Hallucinations             Electronically signed by: Ana Tobar M.D., 6/9/2020 11:49 PM    This dictation has been created using voice recognition software and/or scribes. The accuracy of the dictation is limited by the abilities of the software and the expertise of the scribes. I expect there may be some errors of grammar and possibly content. I made every attempt to manually correct the errors within my dictation. However, errors related to voice recognition software and/or scribes may still exist and should be interpreted within the appropriate context.

## 2020-06-10 NOTE — DISCHARGE PLANNING
Medical Social Work    Referral: Legal hold Transfer to Mental Health Facility    Intervention: LIZ received call from Inocencia at [facility] stating that Dr. Houston has accepted the patient for admission.  Inocencia requested that transport be arranged for 0330 due to other admissions.    LIZ arranged for transportation to be set up through [place] with [facility].    The pt will be picked up at 0330.     LIZ notified the RN of the departure time as well as accepting facility.     LIZ created transfer packet and placed on chart. Original Legal Hold placed in packet.     Plan: Pt will transfer to [facility] at 0330.

## 2020-06-10 NOTE — CONSULTS
"[facility]   TRIAGE ASSESSMENT    Name: Boaz Reis  MRN: [id number]  : 2000  Age: 19 y.o.  Date of assessment: 6/10/2020  PCP: No primary care provider on file.  Persons in attendance: Patient    CHIEF COMPLAINT/PRESENTING ISSUE (as stated by Patient, ER RN, ERP):   Patient is a 20 y/o male with a Schizophrenia diagnosis, presents in the ER endorsing homicidal ideation upon arrival toward no one in particular. Patient denies HI/SI to this writer but states he is afraid because of the numbers and colors on the wall are moving. He further states that he left [place] yesterday (admitted to [facility] on 6/3/2020) and started wandering around the street instead of going home. Patient further reports feeling like his body was \"electric\" then trails off mumbling to himself, responding to internal stimuli. Patient's affect is incongruent, oriented to person and place, indirect eye contact with one-two word responses and actively displays delusional and paranoid thought content. Patient is a poor historian and is only able to verify he was discharged with a prescription for Abilify or Haldol. Unable to contact family for further collateral information. Patient presented with similar compliant 7 days ago and continues to decompensate. Pt would benefit further psychiatric stabilization.   Chief Complaint   Patient presents with   • Homicidal Ideation     pt to ED34 c/o HI x 2months denies SI at this time        CURRENT LIVING SITUATION/SOCIAL SUPPORT: Patient lives with parents. Unknown social support; unable to contact family. Patient is unemployed and unable to vocalize [facility] providers.     BEHAVIORAL HEALTH TREATMENT HISTORY  Does patient/parent report a history of prior behavioral health treatment for patient?   Yes:    Dates Level of Care Facilty/Provider Diagnosis/Problem Medications   2020-2020 IP [facility] Psychosis Haldol, Abilify- unable to verify. Pt is a poor historian           10/2019 IP [facility] " Psychosis                             SAFETY ASSESSMENT - SELF  Does patient acknowledge current or past symptoms of dangerousness to self? no  Does parent/significant other report patient has current or past symptoms of dangerousness to self? N\A  Does presenting problem suggest symptoms of dangerousness to self? No; pt denies SI or hx of attempts. No EMR documentation of SI, SA or SH behaviors.     SAFETY ASSESSMENT - OTHERS  Does patient acknowledge current or past symptoms of aggressive behavior or risk to others? Yes; pt endorses vague  HI upon arrival.    Does parent/significant other report patient has current or past symptoms of aggressive behavior or risk to others?  N\A  Does presenting problem suggest symptoms of dangerousness to others? When discussing HI w/ ERP patient vocalizes thoughts of harming others but is unable to articulate specifically who he would hurt or why.  Patient denies HI or command-type hallucinations to harm others.     Crisis Safety Plan completed and copy given to patient? N\A    ABUSE/NEGLECT SCREENING  Does patient report feeling “unsafe” in his/her home, or afraid of anyone?  Yes; due to present psychosis  Does patient report any history of physical, sexual, or emotional abuse?  no  Does parent or significant other report any of the above? N\A  Is there evidence of neglect by self?  no  Is there evidence of neglect by a caregiver? no  Does the patient/parent report any history of CPS/APS/police involvement related to suspected abuse/neglect or domestic violence? no  Based on the information provided during the current assessment, is a mandated report of suspected abuse/neglect being made?  No    SUBSTANCE USE SCREENING  Yes:  Rajan all substances used in the past 30 days: Patient denies any substance or alcohol use. Previous ER visit 6/2/2020 UDS negative.       Last Use Amount   []   Alcohol     []   Marijuana     []   Heroin     []   Prescription Opioids  (used without  "prescription, for    recreation, or in excess of prescribed amount)     []   Other Prescription  (used without prescription, for    recreation, or in excess of prescribed amount)     []   Cocaine      []   Methamphetamine     []   \"\" drugs (ectasy, MDMA)     []   Other substances        UDS results: pending  Breathalyzer results: 0.00    What consequences does the patient associate with any of the above substance use and or addictive behaviors? None    MENTAL STATUS   Participation: Limited verbal participation  Grooming: Casual  Orientation: Evidence of delusions present  Behavior: Calm  Eye contact: Indirect  Mood: Anxious  Affect: Incongruent with content  Thought process: Loose associations  Thought content: Evidence of delusion and Paranoia  Speech: Hypotalkative and Latency of response  Perception: Evidence of hallucination  Memory:  Poor memory for chronology of events  Insight: Poor  Judgment:  Poor  Other:    Collateral information:   Source:  [] Significant other present in person:   [] Significant other by telephone  [] [facility]   [x] [facility] Staff  [x] [facility]  [x] Other: ERP  [] Unable to complete full assessment due to:  [] Acute intoxication  [] Patient declined to participate/engage  [] Patient verbally unresponsive  [] Significant cognitive deficits  [] Significant perceptual distortions or behavioral disorganization  [x] Other: N/A    CLINICAL IMPRESSIONS:  Primary:  Homicidal Ideation, Hallucinations  Secondary:  Schizophrenia       IDENTIFIED NEEDS/PLAN:  [Trigger DISPOSITION list for any items marked]    []  Imminent safety risk - self [x] Imminent safety risk - others   []  Acute substance withdrawal [x]  Psychosis/Impaired reality testing   [x]  Mood/anxiety []  Substance use/Addictive behavior   []  Maladaptive behaviro []  Parent/child conflict   []  Family/Couples conflict []  Biomedical   []  Housing []  Financial   []   Legal  " Occupational/Educational   []  Domestic violence []  Other:     Disposition: Actively being addressed by Legal Hold, [facility] and [facility]    Does patient express agreement with the above plan? yes    Referral appointment(s) scheduled? N\A    Alert team only:   I have discussed findings and recommendations with Dr. Tobar who is in agreement with these recommendations.     Referral information sent to the following community providers : [facility]    If applicable : Referred  to : Tonia for legal hold follow up at [phone]      Debra Saez RJustinN.  6/10/2020

## 2020-06-10 NOTE — ED NOTES
All belongings and items removed from room. Security called for belongings search  Placed patient into gown, hospital underwear, and non-skid blue socks.  Gave patient urinal and warm blanket  erp at bedside. She is filling out L2K paperwork  Breathalyzer completed .000  Alert team is at bedside   Security is sitting/monitoring patient for out side door way

## 2020-06-17 ENCOUNTER — HOSPITAL ENCOUNTER (EMERGENCY)
Facility: MEDICAL CENTER | Age: 20
End: 2020-06-17
Attending: EMERGENCY MEDICINE
Payer: COMMERCIAL

## 2020-06-17 VITALS
SYSTOLIC BLOOD PRESSURE: 130 MMHG | BODY MASS INDEX: 23.7 KG/M2 | HEIGHT: 72 IN | HEART RATE: 98 BPM | RESPIRATION RATE: 14 BRPM | DIASTOLIC BLOOD PRESSURE: 62 MMHG | WEIGHT: 175 LBS | TEMPERATURE: 98.7 F | OXYGEN SATURATION: 95 %

## 2020-06-17 DIAGNOSIS — R44.3 HALLUCINATIONS: ICD-10-CM

## 2020-06-17 LAB
AMPHET UR QL SCN: NEGATIVE
BARBITURATES UR QL SCN: NEGATIVE
BENZODIAZ UR QL SCN: NEGATIVE
BZE UR QL SCN: NEGATIVE
CANNABINOIDS UR QL SCN: NEGATIVE
METHADONE UR QL SCN: NEGATIVE
OPIATES UR QL SCN: NEGATIVE
OXYCODONE UR QL SCN: NEGATIVE
PCP UR QL SCN: NEGATIVE
POC BREATHALIZER: 0.02 PERCENT (ref 0–0.01)
PROPOXYPH UR QL SCN: NEGATIVE

## 2020-06-17 PROCEDURE — 90791 PSYCH DIAGNOSTIC EVALUATION: CPT

## 2020-06-17 PROCEDURE — 700102 HCHG RX REV CODE 250 W/ 637 OVERRIDE(OP)

## 2020-06-17 PROCEDURE — 302970 POC BREATHALIZER: Performed by: EMERGENCY MEDICINE

## 2020-06-17 PROCEDURE — 80307 DRUG TEST PRSMV CHEM ANLYZR: CPT

## 2020-06-17 PROCEDURE — A9270 NON-COVERED ITEM OR SERVICE: HCPCS

## 2020-06-17 PROCEDURE — 99284 EMERGENCY DEPT VISIT MOD MDM: CPT

## 2020-06-17 RX ORDER — ARIPIPRAZOLE 2 MG/1
2 TABLET ORAL DAILY
Status: DISCONTINUED | OUTPATIENT
Start: 2020-06-18 | End: 2020-06-17

## 2020-06-17 RX ORDER — ARIPIPRAZOLE 2 MG/1
2 TABLET ORAL ONCE
Status: COMPLETED | OUTPATIENT
Start: 2020-06-17 | End: 2020-06-17

## 2020-06-17 RX ADMIN — ARIPIPRAZOLE 2 MG: 2 TABLET ORAL at 22:55

## 2020-06-17 ASSESSMENT — FIBROSIS 4 INDEX: FIB4 SCORE: 0.24

## 2020-06-18 NOTE — ED PROVIDER NOTES
"ED Provider Note    CHIEF COMPLAINT  Chief Complaint   Patient presents with   • Hallucinations     c/o A/V hallucinatcions. Chonrology is unclear but pt states that he was released from [facility] today and was \"walking around\" and reports seeing a \"sharp, scary object\" and \"sharp, jagged voices that I don't like.\" Denies thoughts of harming self or others. Has taken Abilify but states that he was unable to get a refill; unclear when pt last took it.       HPI  Boaz Reis is a 19 y.o. male who presents with auditory and visual hallucinations.  Patient states he has been seeing sharp scary objects and is concerned for his life.  He does not have any suicidal or homicidal ideation.  He states that he was discharged from [facility] today.  He denies drug abuse.  He states has not used any alcohol.  But according to our records he has been here quite frequently for psychosis.  The patient does not have any current medical complaints.    REVIEW OF SYSTEMS  See HPI for further details. All other systems are negative.     PAST MEDICAL HISTORY  No past medical history on file.    FAMILY HISTORY  [unfilled]    SOCIAL HISTORY  Social History     Socioeconomic History   • Marital status: Single     Spouse name: Not on file   • Number of children: Not on file   • Years of education: Not on file   • Highest education level: Not on file   Occupational History   • Not on file   Social Needs   • Financial resource strain: Not on file   • Food insecurity     Worry: Not on file     Inability: Not on file   • Transportation needs     Medical: Not on file     Non-medical: Not on file   Tobacco Use   • Smoking status: Current Some Day Smoker     Types: Cigarettes   • Smokeless tobacco: Never Used   Substance and Sexual Activity   • Alcohol use: Yes     Comment: occ   • Drug use: Yes     Comment: meth    • Sexual activity: Never   Lifestyle   • Physical activity     Days per week: Not on file     Minutes per session: Not on file "   • Stress: Not on file   Relationships   • Social connections     Talks on phone: Not on file     Gets together: Not on file     Attends Congregation service: Not on file     Active member of club or organization: Not on file     Attends meetings of clubs or organizations: Not on file     Relationship status: Not on file   • Intimate partner violence     Fear of current or ex partner: Not on file     Emotionally abused: Not on file     Physically abused: Not on file     Forced sexual activity: Not on file   Other Topics Concern   • Behavioral problems Not Asked   • Interpersonal relationships Not Asked   • Sad or not enjoying activities Not Asked   • Suicidal thoughts Not Asked   • Poor school performance Not Asked   • Reading difficulties Not Asked   • Speech difficulties Not Asked   • Writing difficulties Not Asked   • Inadequate sleep Not Asked   • Excessive TV viewing Not Asked   • Excessive video game use Not Asked   • Inadequate exercise Not Asked   • Sports related Not Asked   • Poor diet Not Asked   • Family concerns for drug/alcohol abuse Not Asked   • Poor oral hygiene Not Asked   • Bike safety Not Asked   • Family concerns vehicle safety Not Asked   Social History Narrative   • Not on file       SURGICAL HISTORY  No past surgical history on file.    CURRENT MEDICATIONS  Home Medications    **Home medications have not yet been reviewed for this encounter**         ALLERGIES  No Known Allergies    PHYSICAL EXAM  VITAL SIGNS: /61   Pulse (!) 109   Temp 37.1 °C (98.7 °F) (Temporal)   Resp 16   Ht 1.829 m (6')   Wt 79.4 kg (175 lb)   SpO2 94%   BMI 23.73 kg/m²       Constitutional: Unkempt and anxious   hENT: Normocephalic, Atraumatic, Bilateral external ears normal, Oropharynx moist, No oral exudates, Nose normal.   Eyes: PERRLA, EOMI, Conjunctiva normal, No discharge.   Neck: Normal range of motion, No tenderness, Supple, No stridor.   Lymphatic: No lymphadenopathy noted.   Cardiovascular:  Tachycardic heart rate, Normal rhythm, No murmurs, No rubs, No gallops.   Thorax & Lungs: Normal breath sounds, No respiratory distress, No wheezing, No chest tenderness.   Abdomen: Bowel sounds normal, Soft, No tenderness, No masses, No pulsatile masses.   Skin: Warm, Dry, No erythema, No rash.   Back: No tenderness, No CVA tenderness.    Extremities: Intact distal pulses, No edema, No tenderness, No cyanosis, No clubbing.    Neurologic: Alert & oriented x 3, Normal motor function, Normal sensory function, No focal deficits noted.   Psychiatric: Anxious with pressured speech, no homicidal or suicidal ideation.     COURSE & MEDICAL DECISION MAKING  Pertinent Labs & Imaging studies reviewed. (See chart for details)  This a 19-year-old gentleman who presents with acute psychosis.  Appears has been on Abilify in the past with some success and therefore will receive the medication at this time.  At this time he cannot care for himself.  Life skills will be consulted for placement in the patient be medically cleared for further psychiatric care.    FINAL IMPRESSION  1.  Psychosis         Electronically signed by: Ruy Givens M.D., 6/17/2020 9:01 PM    This is an addendum his life skills has evaluated the patient.  They were able to speak with renal behavioral [facility] and they do have a bed available.  Therefore the patient will receive the Abilify as mentioned above and he will be discharged and transferred directly to renal behavioral [facility] for an intake exam.  I feel this is the best treatment as the patient recently left the facility and does have his records.

## 2020-06-18 NOTE — ED NOTES
Pt resting in bed watching TV in NAD. Updated on POC: awaiting med from pharmacy and taxi voucher from  to take pt to [facility].

## 2020-06-18 NOTE — ED NOTES
Discharge instructions and follow up care discussed with patient. Patient given time to ask questions and verbalized understanding. Patient AOx4 at discharge and ambulatory to lobby with steady gait. Cab called for pt with cab voucher provide by [facility].

## 2020-06-18 NOTE — CONSULTS
"[facility]   TRIAGE ASSESSMENT    Name: Boaz Reis  MRN: [id number]  : 2000  Age: 19 y.o.  Date of assessment: 2020  PCP: No primary care provider on file.  Persons in attendance: Patient    CHIEF COMPLAINT/PRESENTING ISSUE (as stated by patient): Patient is a 18 y/o male with a Schizophrenia diagnosis, presents in the ER with a complaint of hallucinations. Patient states he was discharged from [facility] this am. Patient reports he was feeling well and not experiencing any Hallucinations at the time of discharge, but later started seeing \"sharp, scary objects\". Patient denies any suicidal or homicidal ideation. He states: \"I need to go back in the hospital\". Patient's affect is incongruent, oriented to person and place, indirect eye contact. Evidence of auditory and visual hallucinations.   Chief Complaint   Patient presents with   • Hallucinations     c/o A/V hallucinatcions. Chonrology is unclear but pt states that he was released from [facility] today and was \"walking around\" and reports seeing a \"sharp, scary object\" and \"sharp, jagged voices that I don't like.\" Denies thoughts of harming self or others. Has taken Abilify but states that he was unable to get a refill; unclear when pt last took it.        CURRENT LIVING SITUATION/SOCIAL SUPPORT: Patient stated, that he lives with his parents, but later stated he was staying at the homeless shelter. Patient unable or unwilling to clear up discrepancy    BEHAVIORAL HEALTH TREATMENT HISTORY  Does patient/parent report a history of prior behavioral health treatment for patient?   Yes:    Dates Level of Care Facilty/Provider Diagnosis/Problem Medications   6/10/66220 - 2020 [facility] Psychosis Abilify   2020 - 2020 [facility] Psychosis Haldol, Abilify (per pervious record)   10/2019 [facility] Psychosis        SAFETY ASSESSMENT - SELF  Does patient acknowledge current or past symptoms of dangerousness to self? no  Does parent/significant other report " "patient has current or past symptoms of dangerousness to self? N\A  Does presenting problem suggest symptoms of dangerousness to self? No - Patient denies any current suicidal or homicidal ideation    SAFETY ASSESSMENT - OTHERS  Does patient acknowledge current or past symptoms of aggressive behavior or risk to others? no  Does parent/significant other report patient has current or past symptoms of aggressive behavior or risk to others?  N\A  Does presenting problem suggest symptoms of dangerousness to others? No    Crisis Safety Plan completed and copy given to patient? no    ABUSE/NEGLECT SCREENING  Does patient report feeling “unsafe” in his/her home, or afraid of anyone?  no  Does patient report any history of physical, sexual, or emotional abuse?  no  Does parent or significant other report any of the above? N\A  Is there evidence of neglect by self?  no  Is there evidence of neglect by a caregiver? no  Does the patient/parent report any history of CPS/APS/police involvement related to suspected abuse/neglect or domestic violence? no  Based on the information provided during the current assessment, is a mandated report of suspected abuse/neglect being made?  No    SUBSTANCE USE SCREENING  Yes:  Rajan all substances used in the past 30 days: Patient denies any substance abuse. UDS pending. Previous UDS's negative      Last Use Amount   []   Alcohol     []   Marijuana     []   Heroin     []   Prescription Opioids  (used without prescription, for    recreation, or in excess of prescribed amount)     []   Other Prescription  (used without prescription, for    recreation, or in excess of prescribed amount)     []   Cocaine      []   Methamphetamine     []   \"\" drugs (ectasy, MDMA)     []   Other substances        UDS results:   Breathalyzer results: 0.023    What consequences does the patient associate with any of the above substance use and or addictive behaviors? None      MENTAL STATUS   Participation: " Limited verbal participation and Guarded  Grooming: Casual  Orientation: Evidence of hallucinations present  Behavior: Calm  Eye contact: Indirect  Mood: Euthymic  Affect: Constricted and Incongruent with content  Thought process: Loose associations  Thought content: Evidence of delusion  Speech: Rate within normal limits and Soft  Perception: Evidence of hallucination  Memory:  Poor memory for chronology of events  Insight: Poor  Judgment:  Limited  Other:    Collateral information: ERP  Source:  [] Significant other present in person:   [] Significant other by telephone  [] [facility]   [x] [facility]  [x] [facility]  [] Other:     [] Unable to complete full assessment due to:  [] Acute intoxication  [] Patient declined to participate/engage  [] Patient verbally unresponsive  [] Significant cognitive deficits  [] Significant perceptual distortions or behavioral disorganization  [] Other:      CLINICAL IMPRESSIONS:  Primary:  Hallucinations  Secondary:  Schizophrenia       IDENTIFIED NEEDS/PLAN:  [Trigger DISPOSITION list for any items marked]    []  Imminent safety risk - self [] Imminent safety risk - others   []  Acute substance withdrawal [x]  Psychosis/Impaired reality testing   []  Mood/anxiety []  Substance use/Addictive behavior   []  Maladaptive behaviro []  Parent/child conflict   []  Family/Couples conflict []  Biomedical   []  Housing []  Financial   []   Legal  Occupational/Educational   []  Domestic violence []  Other:     Disposition: Refer to [facility]    Does patient express agreement with the above plan? yes    Referral appointment(s) scheduled? no    Alert team only:   I have discussed findings and recommendations with Dr. Givens who is in agreement with these recommendations.  Patient not on a legal hold. Called [facility], [facility] agreed to reevaluate patient on arrival. Patient to be given a taxi voucher to [facility].      Shravan Ward  RJustinNJustin  6/17/2020

## 2020-06-18 NOTE — ED TRIAGE NOTES
"Boaz Reis  19 y.o. male  Chief Complaint   Patient presents with   • Hallucinations     c/o A/V hallucinatcions. Chonrology is unclear but pt states that he was released from [facility] today and was \"walking around\" and reports seeing a \"sharp, scary object\" and \"sharp, jagged voices that I don't like.\" Denies thoughts of harming self or others. Has taken Abilify but states that he was unable to get a refill; unclear when pt last took it.       Pt BIB EMS for above complaint. Denies etoh, rec drug use.    Medications given prior to arrival: 4mg zofran. Continues to feel nauseous.    Pt is alert and oriented, speaking in full sentences, and following commands. Placed in gown and connected to monitors. Chart up for ERP.    /61   Pulse (!) 109   Temp 37.1 °C (98.7 °F) (Temporal)   Resp 16   Ht 1.829 m (6')   Wt 79.4 kg (175 lb)   SpO2 94%   BMI 23.73 kg/m²     "

## 2020-06-29 ENCOUNTER — HOSPITAL ENCOUNTER (EMERGENCY)
Dept: HOSPITAL 8 - ED | Age: 20
LOS: 1 days | End: 2020-06-30
Payer: MEDICAID

## 2020-06-29 VITALS — WEIGHT: 198.64 LBS | HEIGHT: 72 IN | BODY MASS INDEX: 26.9 KG/M2

## 2020-06-29 VITALS — SYSTOLIC BLOOD PRESSURE: 122 MMHG | DIASTOLIC BLOOD PRESSURE: 53 MMHG

## 2020-06-29 DIAGNOSIS — R11.0: ICD-10-CM

## 2020-06-29 DIAGNOSIS — B37.89: Primary | ICD-10-CM

## 2020-06-29 DIAGNOSIS — R51: ICD-10-CM

## 2020-06-29 PROCEDURE — 99281 EMR DPT VST MAYX REQ PHY/QHP: CPT

## 2020-06-29 PROCEDURE — 99283 EMERGENCY DEPT VISIT LOW MDM: CPT

## 2020-06-29 NOTE — NUR
Pt presents to ed c/o olea since 1700 this pm and a rash to groin since 1500. "i 
dont know what it looks like, i havent really wanted to look at it." States HA 
in front of head and experiencing nausea s/t. Denies nuchal ridgidity. Denies 
recent head trauma. "i have been walking a lot because that is the only way for 
me to get home... i think i may be dehydrated." Gross neuro intact in room. 
Awaiting ERP assessment.

## 2020-07-08 ENCOUNTER — HOSPITAL ENCOUNTER (EMERGENCY)
Facility: MEDICAL CENTER | Age: 20
End: 2020-07-09
Attending: EMERGENCY MEDICINE
Payer: COMMERCIAL

## 2020-07-08 DIAGNOSIS — F23 ACUTE PSYCHOSIS (HCC): ICD-10-CM

## 2020-07-08 DIAGNOSIS — R45.850 HOMICIDAL IDEATION: ICD-10-CM

## 2020-07-08 LAB
ALBUMIN SERPL BCP-MCNC: 4.8 G/DL (ref 3.2–4.9)
ALBUMIN/GLOB SERPL: 1.9 G/DL
ALP SERPL-CCNC: 72 U/L (ref 30–99)
ALT SERPL-CCNC: 30 U/L (ref 2–50)
AMPHET UR QL SCN: NEGATIVE
ANION GAP SERPL CALC-SCNC: 15 MMOL/L (ref 7–16)
APAP SERPL-MCNC: <5 UG/ML (ref 10–30)
AST SERPL-CCNC: 19 U/L (ref 12–45)
BARBITURATES UR QL SCN: NEGATIVE
BASOPHILS # BLD AUTO: 0.6 % (ref 0–1.8)
BASOPHILS # BLD: 0.07 K/UL (ref 0–0.12)
BENZODIAZ UR QL SCN: NEGATIVE
BILIRUB SERPL-MCNC: 0.2 MG/DL (ref 0.1–1.5)
BUN SERPL-MCNC: 14 MG/DL (ref 8–22)
BZE UR QL SCN: NEGATIVE
CALCIUM SERPL-MCNC: 9.5 MG/DL (ref 8.5–10.5)
CANNABINOIDS UR QL SCN: NEGATIVE
CHLORIDE SERPL-SCNC: 102 MMOL/L (ref 96–112)
CO2 SERPL-SCNC: 22 MMOL/L (ref 20–33)
CREAT SERPL-MCNC: 0.74 MG/DL (ref 0.5–1.4)
EOSINOPHIL # BLD AUTO: 0.07 K/UL (ref 0–0.51)
EOSINOPHIL NFR BLD: 0.6 % (ref 0–6.9)
ERYTHROCYTE [DISTWIDTH] IN BLOOD BY AUTOMATED COUNT: 39.6 FL (ref 35.9–50)
GLOBULIN SER CALC-MCNC: 2.5 G/DL (ref 1.9–3.5)
GLUCOSE SERPL-MCNC: 94 MG/DL (ref 65–99)
HCT VFR BLD AUTO: 44.5 % (ref 42–52)
HGB BLD-MCNC: 15.3 G/DL (ref 14–18)
IMM GRANULOCYTES # BLD AUTO: 0.03 K/UL (ref 0–0.11)
IMM GRANULOCYTES NFR BLD AUTO: 0.3 % (ref 0–0.9)
LYMPHOCYTES # BLD AUTO: 3.4 K/UL (ref 1–4.8)
LYMPHOCYTES NFR BLD: 30.5 % (ref 22–41)
MCH RBC QN AUTO: 29.8 PG (ref 27–33)
MCHC RBC AUTO-ENTMCNC: 34.4 G/DL (ref 33.7–35.3)
MCV RBC AUTO: 86.6 FL (ref 81.4–97.8)
METHADONE UR QL SCN: NEGATIVE
MONOCYTES # BLD AUTO: 0.7 K/UL (ref 0–0.85)
MONOCYTES NFR BLD AUTO: 6.3 % (ref 0–13.4)
NEUTROPHILS # BLD AUTO: 6.88 K/UL (ref 1.82–7.42)
NEUTROPHILS NFR BLD: 61.7 % (ref 44–72)
NRBC # BLD AUTO: 0 K/UL
NRBC BLD-RTO: 0 /100 WBC
OPIATES UR QL SCN: NEGATIVE
OXYCODONE UR QL SCN: NEGATIVE
PCP UR QL SCN: NEGATIVE
PLATELET # BLD AUTO: 268 K/UL (ref 164–446)
PMV BLD AUTO: 9.4 FL (ref 9–12.9)
POC BREATHALIZER: 0 PERCENT (ref 0–0.01)
POTASSIUM SERPL-SCNC: 3.8 MMOL/L (ref 3.6–5.5)
PROPOXYPH UR QL SCN: NEGATIVE
PROT SERPL-MCNC: 7.3 G/DL (ref 6–8.2)
RBC # BLD AUTO: 5.14 M/UL (ref 4.7–6.1)
SALICYLATES SERPL-MCNC: <1 MG/DL (ref 15–25)
SODIUM SERPL-SCNC: 139 MMOL/L (ref 135–145)
WBC # BLD AUTO: 11.2 K/UL (ref 4.8–10.8)

## 2020-07-08 PROCEDURE — 302970 POC BREATHALIZER: Performed by: EMERGENCY MEDICINE

## 2020-07-08 PROCEDURE — 93005 ELECTROCARDIOGRAM TRACING: CPT | Performed by: EMERGENCY MEDICINE

## 2020-07-08 PROCEDURE — 36415 COLL VENOUS BLD VENIPUNCTURE: CPT

## 2020-07-08 PROCEDURE — 85025 COMPLETE CBC W/AUTO DIFF WBC: CPT

## 2020-07-08 PROCEDURE — 99285 EMERGENCY DEPT VISIT HI MDM: CPT

## 2020-07-08 PROCEDURE — 80307 DRUG TEST PRSMV CHEM ANLYZR: CPT

## 2020-07-08 PROCEDURE — 80053 COMPREHEN METABOLIC PANEL: CPT

## 2020-07-08 ASSESSMENT — FIBROSIS 4 INDEX: FIB4 SCORE: 0.24

## 2020-07-09 VITALS
OXYGEN SATURATION: 98 % | DIASTOLIC BLOOD PRESSURE: 72 MMHG | RESPIRATION RATE: 15 BRPM | SYSTOLIC BLOOD PRESSURE: 118 MMHG | TEMPERATURE: 97.3 F | HEART RATE: 72 BPM | HEIGHT: 72 IN | BODY MASS INDEX: 24.79 KG/M2 | WEIGHT: 183 LBS

## 2020-07-09 PROCEDURE — 90791 PSYCH DIAGNOSTIC EVALUATION: CPT

## 2020-07-09 PROCEDURE — 99284 EMERGENCY DEPT VISIT MOD MDM: CPT | Performed by: PSYCHIATRY & NEUROLOGY

## 2020-07-09 RX ORDER — ARIPIPRAZOLE 10 MG/1
10 TABLET ORAL DAILY
COMMUNITY
End: 2021-01-29

## 2020-07-09 ASSESSMENT — ENCOUNTER SYMPTOMS
CONSTITUTIONAL NEGATIVE: 1
MUSCULOSKELETAL NEGATIVE: 1
CARDIOVASCULAR NEGATIVE: 1
GASTROINTESTINAL NEGATIVE: 1
HALLUCINATIONS: 1
NEUROLOGICAL NEGATIVE: 1
RESPIRATORY NEGATIVE: 1
EYES NEGATIVE: 1

## 2020-07-09 NOTE — ED NOTES
Pt resting in rm, easy, equal respirations. Pt remains calm, cooperative, under direct obs of designee.   Report given.

## 2020-07-09 NOTE — CONSULTS
[facility]   TRIAGE ASSESSMENT    Name: Boaz Reis  MRN: [id number]  : 2000  Age: 19 y.o.  Date of assessment: 2020  PCP: No primary care provider on file.  Persons in attendance: Patient    CHIEF COMPLAINT/PRESENTING ISSUE (as stated by Patient, ER RN, ERP): This 19y male patient suffers from chronic schizophrenia. He was apparently recently released from [facility] inpt treatment but apparently quickly decompensated and developed command auditory hallucinations telling him to cut his harms to bleed out and harm others, but has no one in particular in mind. This pt also appears to be suffering from internal stimuli. He has pronounced latency of response. He is unable to detail much of why he is in the er or his op tx psychiatrist or therapist nor psychiatric meds he should be on. He denies si presently and is not receiving commands to harm himself at this time. Just the same, he is very psychotic clearly unable to care for himself currently. He has had recent er admissions with similar presentation.   Chief Complaint   Patient presents with   • Homicidal Ideation     Pt BIB EMS, from [place] with generalized Homicidal Ideation. hearing voices to kill people.         CURRENT LIVING SITUATION/SOCIAL SUPPORT: Patient states that he lives with his Dad. He is single and has no children. Otherwise he is unable to discuss much about his social supoet or social background. Presently this patient is unemployed and unable to vocalize [facility] providers.     BEHAVIORAL HEALTH TREATMENT HISTORY  Does patient/parent report a history of prior behavioral health treatment for patient?   Yes: Per recent emr   Dates Level of Care Facilty/Provider Diagnosis/Problem Medications   2020-2020 IP [facility] Psychosis Haldol, Abilify- unable to verify. Pt is a poor historian           10/2019 IP [facility] Psychosis                             SAFETY ASSESSMENT - SELF  Does patient acknowledge current or past symptoms of  dangerousness to self? no  Does parent/significant other report patient has current or past symptoms of dangerousness to self? N\A  Does presenting problem suggest symptoms of dangerousness to self? No; pt was having auditory command hallucinations telling him to hurt himself by cutting his harms but presently denies any thoughts.This pt denies present SI or hx of attempts. He denies any plans of self harm. No EMR documentation of SI, SA or SH behaviors.He denies any access to a firearm.     SAFETY ASSESSMENT - OTHERS  Does patient acknowledge current or past symptoms of aggressive behavior or risk to others? Yes; pt endorses vague hi upon arrival.    Does parent/significant other report patient has current or past symptoms of aggressive behavior or risk to others?  N\A  Does presenting problem suggest symptoms of dangerousness to others? Pt had some command auditory hallucinations telling him to hurt others earlier but presently denies and denies having any person or persons in mind. He denies ever hurting anyone.    Crisis Safety Plan completed and copy given to patient? No pt on a hold     ABUSE/NEGLECT SCREENING  Does patient report feeling “unsafe” in his/her home, or afraid of anyone? no    Does patient report any history of physical, sexual, or emotional abuse?  no  Does parent or significant other report any of the above? N\A  Is there evidence of neglect by self?  no  Is there evidence of neglect by a caregiver? no  Does the patient/parent report any history of CPS/APS/police involvement related to suspected abuse/neglect or domestic violence? no  Based on the information provided during the current assessment, is a mandated report of suspected abuse/neglect being made?  No    SUBSTANCE USE SCREENING  Yes:  Rajan all substances used in the past 30 days: Patient denies any substance or alcohol use.        Last Use Amount   []   Alcohol     []   Marijuana     []   Heroin     []   Prescription Opioids  (used  "without prescription, for    recreation, or in excess of prescribed amount)     []   Other Prescription  (used without prescription, for    recreation, or in excess of prescribed amount)     []   Cocaine      []   Methamphetamine     []   \"\" drugs (ectasy, MDMA)     []   Other substances        UDS results: neg  Breathalyzer results: 0.00    What consequences does the patient associate with any of the above substance use and or addictive behaviors? None    MENTAL STATUS   Participation: Limited verbal participation  Grooming: Casual  Orientation: alert, oriented to name ,place   Behavior: Calm  Eye contact: poor  Mood: Anxious and despondent  Affect: constricted  Thought process: some confusion  Thought content: auditory and visual hallucinations  Speech: Hypotalkative and Latency of response  Perception: some paranoia  Memory:  Poor memory for chronology of events  Insight: Poor  Judgment:  Poor  Other:    Collateral information:   Source:  [] Significant other present in person:   [] Significant other by telephone  [] [facility]   [x] [facility] Nursing Staff  [x] [facility]  [x] Other: ERP  [] Unable to complete full assessment due to:  [] Acute intoxication  [] Patient declined to participate/engage  [] Patient verbally unresponsive  [] Significant cognitive deficits  [] Significant perceptual distortions or behavioral disorganization  [x] Other: N/A    CLINICAL IMPRESSIONS:  Primary: Acute psychosis    Secondary:underlying anxiety and depression.         IDENTIFIED NEEDS/PLAN:  [Trigger DISPOSITION list for any items marked]    [x]  Imminent safety risk - self [x] Imminent safety risk - others   []  Acute substance withdrawal [x]  Psychosis/Impaired reality testing   [x]  Mood/anxiety []  Substance use/Addictive behavior   []  Maladaptive behaviro []  Parent/child conflict   []  Family/Couples conflict []  Biomedical   []  Housing []  Financial   []   Legal  Occupational/Educational   [] "  Domestic violence []  Other:     Disposition: Actively being addressed by Legal Hold, [facility] and [facility], [facility], [facility] and [facility] and [facility]    Does patient express agreement with the above plan? yes    Referral appointment(s) scheduled? No    Alert team only:   I have discussed findings and recommendations with Dr. Hou who is in agreement with these recommendations. 19y male presents in the er with acute psychosis. He has been placed baldo legal hold and will be transferred to in psychiatric treatment.    Referral information sent to the following community providers : [facility]    If applicable : Referred  to : Henny for legal hold follow up at 0100      Rajan Saenz R.N.  7/9/2020

## 2020-07-09 NOTE — DISCHARGE PLANNING
Elena from [facility] called and they will accept Pt.   Dr. Houston will be admitting physician.     SW will arrange transportation and update RN.

## 2020-07-09 NOTE — ED NOTES
Report given to ERNESTO at pt bedside. Pt agreeable to discharge to [facility]. Pt A&OX4, RR even and unlabored. Pt walked out of ED without difficulty to EMS.

## 2020-07-09 NOTE — DISCHARGE PLANNING
Medical Social Work    Referral: Legal Hold    Intervention: Legal Hold Paperwork given to SW by Life Skills RN Rajan Saenz    Legal Hold Initiated: Date: 07- Time: 1905    Patient’s Insurance Listed on Face Sheet: [facility]    Referrals sent to: [place], [facility]    This referral contains the following information:  1) Face sheet _x___  2) Page 1 and Page 2 of Legal Hold __x__  3) Alert Team Assessment/Psych Assessment _x___  4) Head to toe physical exam __x__  5) Urine Drug Screen __x__  6) Blood Alcohol _x___  7) Vital signs __x__  8) Pregnancy test when applicable __NA_  9) Medications list __x__    Plan: Patient will transfer to mental health facility once acceptance is obtained

## 2020-07-09 NOTE — DISCHARGE PLANNING
PCS completed and faxed to [facility].  Transportation time arranged for 1530    Transfer Packet completed and faxed to [facility].  Transfer Packet completed with Original Legal Hold placed inside.  RN updated on transfer and transfer time.     Elena at [facility] updated on 1530 [facility] transportation time.

## 2020-07-09 NOTE — ED NOTES
Received report from NYA Isbell. Assumed care of patient. Pt notified of staff change, verbalized understanding. Pt alert, sitting up in bed, watching TV. Pt still reports HI thoughts without specific person. Pt denies SI. Pt is L2K, High CSSR.     1:1 sitter remains at pt bedside for monitoring.

## 2020-07-09 NOTE — ED TRIAGE NOTES
Chief Complaint   Patient presents with   • Homicidal Ideation     Pt BIB EMS, from [place] with generalized Homicidal Ideation. hearing voices to kill people.    Pt/staff masked and in appropriate PPE during encounter.  Pt denies fever/travel or being in contact with anyone testing positive for COVID/Corona.  All belongings removed from room, Security outside of .

## 2020-07-09 NOTE — ED NOTES
1:1 sitter remains at pt bedside for monitoring. Pt sitting up in bed, watching TV. RR even and unlabored. NAD.

## 2020-07-09 NOTE — ED PROVIDER NOTES
ED Provider Note    Scribed for Huber Hou M.D. by Marcelino Aceves. 7/8/2020  7:07 PM    Primary care provider: No primary care provider on file.  Means of arrival: Walk In  History obtained from: Patient  History limited by: None    CHIEF COMPLAINT  Chief Complaint   Patient presents with   • Homicidal Ideation     Pt BIB EMS, from [place] with generalized Homicidal Ideation. hearing voices to kill people.        HPI  Boaz Reis is a 19 y.o. male who presents to the Emergency Department for evaluation of homicidal ideations. He states that he has had lifelong auditory hallucinations which tell him to kill people. He was previously being treated at [facility] during the last few days, and was released earlier today. He states that he is compliant with all his medications, however feels that the voices are louder today and thus has come here. Patient also reports having a left sided groin rash at this time. He otherwise denies having any fevers, chills, cough, shortness of breath, visual allocations or suicidal ideations.    REVIEW OF SYSTEMS  Pertinent positives include: homicidal ideations, left sided groin rash. Pertinent negatives include: fevers, chills, cough, shortness of breath, visual allocations or suicidal ideations. See history of present illness. All other systems are negative.     PAST MEDICAL HISTORY  Homicidal ideations    SURGICAL HISTORY  patient denies any surgical history    SOCIAL HISTORY  Social History     Tobacco Use   • Smoking status: Current Some Day Smoker     Types: Cigarettes   • Smokeless tobacco: Never Used   Substance Use Topics   • Alcohol use: Yes     Comment: occ   • Drug use: Yes     Comment: meth       Social History     Substance and Sexual Activity   Drug Use Yes    Comment: meth        FAMILY HISTORY  Family History   Problem Relation Age of Onset   • Hypertension Father        CURRENT MEDICATIONS  No current facility-administered medications on file prior to  encounter.      No current outpatient medications on file prior to encounter.       ALLERGIES  No Known Allergies    PHYSICAL EXAM  VITAL SIGNS: /87   Pulse (!) 110   Temp 36.8 °C (98.3 °F) (Temporal)   Resp 18   Ht 1.829 m (6')   Wt 83 kg (183 lb)   SpO2 95%   BMI 24.82 kg/m²     Constitutional: Alert in no apparent distress.  HENT: No signs of trauma, Bilateral external ears normal, Nose normal. Uvula midline.   Eyes: Pupils are equal and reactive, Conjunctiva normal, Non-icteric.   Neck: Normal range of motion, No tenderness, Supple, No stridor.   Lymphatic: No lymphadenopathy noted.   Cardiovascular: Regular rate and rhythm, no murmurs.   Thorax & Lungs: Normal breath sounds, No respiratory distress, No wheezing, No chest tenderness.   Abdomen:  Soft, No tenderness, No peritoneal signs, No masses, No pulsatile masses.   Skin: Warm, Dry, No erythema, No rash.   Back: No bony tenderness, No CVA tenderness.   Extremities: Intact distal pulses, No edema, No tenderness, No cyanosis.  Musculoskeletal: Good range of motion in all major joints. No tenderness to palpation or major deformities noted.   Neurologic: Alert , Normal motor function, Normal sensory function, No focal deficits noted.   Psychiatric: endorses auditory hallucinations that tell him to harm others     DIAGNOSTIC STUDIES / PROCEDURES    LABS  Labs Reviewed   CBC WITH DIFFERENTIAL - Abnormal; Notable for the following components:       Result Value    WBC 11.2 (*)     All other components within normal limits   ACETAMINOPHEN - Abnormal; Notable for the following components:    Acetaminophen -Tylenol <5 (*)     All other components within normal limits   SALICYLATE - Abnormal; Notable for the following components:    Salicylates, Quant. <1 (*)     All other components within normal limits   POC BREATHALIZER - Normal   URINE DRUG SCREEN   COMP METABOLIC PANEL   ESTIMATED GFR      All labs reviewed by me.    EKG  12 Lead EKG interpreted by me  to show:  Indication: Arrhythmia  Normal sinus rhythm  Rate 90  Axis: Normal  Intervals: Normal  Normal T waves  Normal ST segments  Diffuse J point elevation, likely normal loop hole patter  My impression of this EKG: Unremarkable EKG    COURSE & MEDICAL DECISION MAKING  Nursing notes, VS, PMSFHx reviewed in chart.    19 y.o. male p/w chief complaint of homicidal ideations.    7:07 PM Patient seen and examined at bedside. Discussed with the patient that I am going to check his blood, urine and an EKG and will continue to monitor him here in the ED. He understands.    I verified that the patient was wearing a mask and I was wearing appropriate PPE every time I entered the room. The patient's mask was on the patient at all times during my encounter except for a brief view of the oropharynx.     The differential diagnoses include but are not limited to:   pt placed on hold due to homicidal ideation  No suicidal ideation.    BMP negative for significant electrolyte abnormality.  ASA/APAP negative for ingestion.  EKG without significant new cardiac conduction abnormality  No obvious medial cause of patient's complaints, appropriate for psychiatric evaluation at this time.    Pt care s/o to Dr. España pending psychiatric transfer    FINAL IMPRESSION  1. Homicidal ideation    2. Acute psychosis (HCC)          Marcelino DUARTE (Scribe), am scribing for, and in the presence of, Huber Hou M.D..    Electronically signed by: Marcelino Aceves (Scribe), 7/8/2020    Huber DUARTE M.D. personally performed the services described in this documentation, as scribed by Marcelino Aceves in my presence, and it is both accurate and complete. C.    The note accurately reflects work and decisions made by me.  Huber Hou M.D.  7/9/2020  2:19 AM

## 2020-07-09 NOTE — ED NOTES
Patient's home medications have been reviewed by the pharmacy team.     History reviewed. No pertinent past medical history.    Patient's Medications   New Prescriptions    No medications on file   Previous Medications    ARIPIPRAZOLE (ABILIFY) 10 MG TAB    Take 10 mg by mouth every day.   Modified Medications    No medications on file   Discontinued Medications    No medications on file          A:  Per [facility], patient was receiving Abilify 10 mg QD. His last known dose was 7/7/20 and states to be adherent.        P:      Psychiatry consulted with patient and has a plan of care in place. No further recommendations needed.      Nela Calvillo, Pharmacy Intern

## 2020-07-09 NOTE — ED NOTES
1:1 sitter remains at pt bedside. NAD. No needs at this time. Waiting on [facility] for transport to [facility].

## 2020-07-09 NOTE — ED PROVIDER NOTES
ED Provider Note    Patient was signed out to me by Dr. España earlier this morning at 7 AM.  The patient was seen by psychiatry, they recommend Risperdal for long-term and the legal hold has been extended.  The patient has had no issues throughout the day or throughout the evening.  Currently waiting for acceptance to a local psychiatric facility for evaluation and management.    /76   Pulse 78   Temp 36.3 °C (97.3 °F) (Temporal)   Resp 16   Ht 1.829 m (6')   Wt 83 kg (183 lb)   SpO2 96%   BMI 24.82 kg/m²

## 2020-07-09 NOTE — CONSULTS
PSYCHIATRIC INTAKE EVALUATION    *Reason for admission: was at [facility] and released and came here for  telling him to kill others. He was also here on 6/17/2020, 6/9/2020,6/2/2020, 10/23/2019.                *Reason for consult:  HI    *Requesting Physician/APN:  VIKAS Carrington MD         Legal Hold status: +          *Chief Complaint:  AH HI     *HPI (includes Psychiatric ROS):  20 yo male with schizophrenia who is again having AH with non specific HI. He is sleeping. No appetite changes. Not SI. Doesn't know why he has the AH, doesn't feel aggressive. No recent stressors and denies drugs/alcohol.    Depression: no acute symptoms  Anxiety: over HI   Psychosis: as noted         *Medical Review Of Symptoms (not dx conditions):   Review of Systems   Constitutional: Negative.    HENT: Negative.    Eyes: Negative.    Respiratory: Negative.    Cardiovascular: Negative.    Gastrointestinal: Negative.    Genitourinary: Negative.    Musculoskeletal: Negative.    Skin: Negative.    Neurological: Negative.    Psychiatric/Behavioral: Positive for hallucinations.       *Psychiatric Examination:   Vitals: Blood pressure 120/76, pulse 78, temperature 36.3 °C (97.3 °F), temperature source Temporal, resp. rate 16, height 1.829 m (6'), weight 83 kg (183 lb), SpO2 96 %.  General Appearance: normal habitus, good eye contact  Abnormal Movements: none  Gait and Posture:lying in bed  Speech:wnl  Thought Process: normal rate  Associations:linear  Abnormal or Psychotic Thoughts:none noted  Judgement and Insight: fair at best  Orientation: 0x4  Recent and Remote Memory:grossly intact  Attention Span and Concentration: intact  Language:not tested  Fund of Knowledge:not tested  Mood and Affect:blunted  SI/HI: denies : the AH tell him to kill       *PAST MEDICAL/PSYCH/FAMILY/SOCIAL(as reported by patient):       *medical hx:           History reviewed. No pertinent past medical history.  History reviewed. No pertinent surgical  "history.     *psychiatric hx:    summary of records: can laugh inappropriately, internal stimuli, AH, . \"Denies any suicidal ideation.  I am told by EMS he was acting erratically around family and they called 911 \"..... \" taking Haldol but he states he is had worsening thoughts over the last 2 months of wanting to harm others due to some violent hallucinations and delusions\".    SAs: none on file.   Dx: schizophrenia    *family Psych hx:  My brother had schizophrenia once     *social hx: lives with father and says things are good between them  Alcohol: denies  Drugs:denies   Note: none in past visits.     *MEDICAL HX: labs, MARS, medications, etc were reviewed. Only those findings of potential interest to psychiatry are noted below:    *Current Medical issues:   see below     *Allergies:  No Known Allergies  *Current Medications:  No current facility-administered medications for this encounter.   No current outpatient medications on file.  *ECG: personally reviewed QTc 408  *Imaging: personally reviewed CT 2019: no significant findings.        *Labs:  Recent Labs     07/08/20 1954   WBC 11.2*   RBC 5.14   HEMOGLOBIN 15.3   HEMATOCRIT 44.5   MCV 86.6   MCH 29.8   RDW 39.6   PLATELETCT 268   MPV 9.4   NEUTSPOLYS 61.70   LYMPHOCYTES 30.50   MONOCYTES 6.30   EOSINOPHILS 0.60   BASOPHILS 0.60     Lab Results   Component Value Date/Time    SODIUM 139 07/08/2020 07:54 PM    POTASSIUM 3.8 07/08/2020 07:54 PM    CHLORIDE 102 07/08/2020 07:54 PM    CO2 22 07/08/2020 07:54 PM    GLUCOSE 94 07/08/2020 07:54 PM    BUN 14 07/08/2020 07:54 PM    CREATININE 0.74 07/08/2020 07:54 PM         Lab Results   Component Value Date/Time    BREATHALIZER 0.00 07/08/2020 1946     No components found for: BLOODALCOHOL   Lab Results   Component Value Date/Time    AMPHUR Negative 07/08/2020 2232    BARBSURINE Negative 07/08/2020 2232    BENZODIAZU Negative 07/08/2020 2232    COCAINEMET Negative 07/08/2020 2232    METHADONE Negative 07/08/2020 " 2232    OPIATES Negative 07/08/2020 2232    OXYCODN Negative 07/08/2020 2232    PCPURINE Negative 07/08/2020 2232    PROPOXY Negative 07/08/2020 2232    CANNABINOID Negative 07/08/2020 2232     Lab Results   Component Value Date/Time    FREET4 0.99 10/23/2019 0305         *ASSESSMENT/PLAN    1. Schizophrenia acute on chronic  -Risperdol 1/2 mg.  -consider long acting if this works. He says he is compliant with meds but his AH get better in the hospital, then he is discharged and they get worse.        2. Medical:  -none acutely        Legal hold: extended.    *Will Follow  *Thank you for the consult

## 2020-07-10 LAB — EKG IMPRESSION: NORMAL

## 2020-07-13 ENCOUNTER — HOSPITAL ENCOUNTER (EMERGENCY)
Dept: HOSPITAL 8 - ED | Age: 20
Discharge: HOME | End: 2020-07-13
Payer: COMMERCIAL

## 2020-07-13 VITALS — BODY MASS INDEX: 26.87 KG/M2 | HEIGHT: 72 IN | WEIGHT: 198.42 LBS

## 2020-07-13 VITALS — DIASTOLIC BLOOD PRESSURE: 75 MMHG | SYSTOLIC BLOOD PRESSURE: 124 MMHG

## 2020-07-13 DIAGNOSIS — B37.2: Primary | ICD-10-CM

## 2020-07-13 DIAGNOSIS — Z72.9: ICD-10-CM

## 2020-07-13 PROCEDURE — 99283 EMERGENCY DEPT VISIT LOW MDM: CPT

## 2020-10-30 ENCOUNTER — HOSPITAL ENCOUNTER (EMERGENCY)
Dept: HOSPITAL 8 - ED | Age: 20
LOS: 1 days | Discharge: HOME | End: 2020-10-31
Payer: MEDICAID

## 2020-10-30 VITALS — BODY MASS INDEX: 27.53 KG/M2 | HEIGHT: 72 IN | WEIGHT: 203.27 LBS

## 2020-10-30 VITALS — SYSTOLIC BLOOD PRESSURE: 121 MMHG | DIASTOLIC BLOOD PRESSURE: 75 MMHG

## 2020-10-30 DIAGNOSIS — M79.672: ICD-10-CM

## 2020-10-30 DIAGNOSIS — Z72.9: ICD-10-CM

## 2020-10-30 DIAGNOSIS — G89.11: Primary | ICD-10-CM

## 2020-10-30 DIAGNOSIS — F17.210: ICD-10-CM

## 2020-10-30 PROCEDURE — 99406 BEHAV CHNG SMOKING 3-10 MIN: CPT

## 2020-10-30 PROCEDURE — 99283 EMERGENCY DEPT VISIT LOW MDM: CPT

## 2020-10-30 NOTE — NUR
PT CALLED MULTIPLE TIMES FOR ROOM, ANSWERS BUT REMAINS SEATED, INQUIRE IF STILL 
WANTS TO BE SEEN, PT THEN ARRIVES AMBULATORY TO ROOM WITH STEADY GAIT.

## 2020-10-30 NOTE — NUR
MD HANNA AND PT IS CURRENTLY WITHOUT COMPLAINTS AND STATES THAT HE IS OKAY TO 
GO, REQUEST BUS PASS, PROVIDED WITH BUS PASS AND PT QUITE HAPPY ABOUT THIS 
SITUATION.  DRESSING SELF AND NAD.

## 2020-10-30 NOTE — NUR
PT IS COOPERATIVE AND PLEASANT, VERY DISHEVELLED, IS NOT ABLE TO PROVIDE 
ADDRESS FOR DISCHARGE, REQUESTING TO BE SENT TO SOMEPLACE HE HAS LIVED BEFORE.  
AWARE THAT WE CAN NOT SEND HIM THERE, DENIES SI/HI.  MD TO ROOM FOR EVAL AT 
THIS TIME.  PT PROVIDED WITH SNACKS AND FLUIDS.

## 2021-01-28 ENCOUNTER — HOSPITAL ENCOUNTER (EMERGENCY)
Facility: MEDICAL CENTER | Age: 21
End: 2021-01-29
Attending: EMERGENCY MEDICINE
Payer: COMMERCIAL

## 2021-01-28 DIAGNOSIS — R45.851 SUICIDAL IDEATION: ICD-10-CM

## 2021-01-28 PROCEDURE — 80307 DRUG TEST PRSMV CHEM ANLYZR: CPT

## 2021-01-28 PROCEDURE — 90791 PSYCH DIAGNOSTIC EVALUATION: CPT

## 2021-01-28 PROCEDURE — 99285 EMERGENCY DEPT VISIT HI MDM: CPT

## 2021-01-28 PROCEDURE — 302970 POC BREATHALIZER: Performed by: EMERGENCY MEDICINE

## 2021-01-28 ASSESSMENT — FIBROSIS 4 INDEX: FIB4 SCORE: 0.27

## 2021-01-29 VITALS
RESPIRATION RATE: 16 BRPM | BODY MASS INDEX: 27.73 KG/M2 | TEMPERATURE: 98.6 F | OXYGEN SATURATION: 97 % | HEART RATE: 68 BPM | HEIGHT: 68 IN | SYSTOLIC BLOOD PRESSURE: 113 MMHG | WEIGHT: 182.98 LBS | DIASTOLIC BLOOD PRESSURE: 66 MMHG

## 2021-01-29 PROCEDURE — 700102 HCHG RX REV CODE 250 W/ 637 OVERRIDE(OP): Performed by: EMERGENCY MEDICINE

## 2021-01-29 PROCEDURE — A9270 NON-COVERED ITEM OR SERVICE: HCPCS | Performed by: EMERGENCY MEDICINE

## 2021-01-29 RX ORDER — ARIPIPRAZOLE 10 MG/1
10 TABLET ORAL DAILY
Status: DISCONTINUED | OUTPATIENT
Start: 2021-01-29 | End: 2021-01-29 | Stop reason: HOSPADM

## 2021-01-29 RX ADMIN — ARIPIPRAZOLE 10 MG: 10 TABLET ORAL at 06:23

## 2021-01-29 NOTE — ED NOTES
Med rec completed   Allergies reviewed  No PO antibiotics in the last 14 days    Pt is not currently taking any daily medications

## 2021-01-29 NOTE — ED TRIAGE NOTES
Pt presents via EMS, wearing a mask reporting:  Chief Complaint   Patient presents with   • Legal 2000     Pt transferred from care home for SI   • Suicidal Ideation     No current plan

## 2021-01-29 NOTE — CONSULTS
[facility]   TRIAGE ASSESSMENT    Name: Boaz Reis  MRN: [id number]  : 2000  Age: 20 y.o.  Date of assessment: 2021  PCP: No primary care provider on file.  Persons in attendance: Patient    CHIEF COMPLAINT/PRESENTING ISSUE (as stated by patient): Patient is a 20 y.o. male that expressed suicidal ideation when being released  from halfway today. Transported to [facility] from halfway. Patient appears calm, affect is constricted, with evidence of internal stimuli. Unable to complete a comprehensive consult, but confirms hearing voices that are telling him to harm himself. Mostly one to two word answers to questions with latency of response. Confirms  he has been in a  hospital, but doesn't recall names or dates. Denies any hx of SI or HI. Patient responded yes, when questioned if he was schizophrenic.  Denies being on any medication or seeing any MH personal. Patient meets criteria for legal hold for safety and acute psychosis. Patient will benefit from a  hospitalization.  Chief Complaint   Patient presents with   • Legal 2000     Pt transferred from halfway for SI   • Suicidal Ideation     No current plan        CURRENT LIVING SITUATION/SOCIAL SUPPORT: Lives with his parents. Works at [employer]    BEHAVIORAL HEALTH TREATMENT HISTORY  Does patient/parent report a history of prior behavioral health treatment for patient?   Yes:  Difficult to get clear history from patient. He states he has been in a  hospital before, unable to recall dates or names of hospital.         SAFETY ASSESSMENT - SELF  Does patient acknowledge current or past symptoms of dangerousness to self? no  Does parent/significant other report patient has current or past symptoms of dangerousness to self? N\A  Does presenting problem suggest symptoms of dangerousness to self? Yes:     Past Current    Suicidal Thoughts: []  [x]    Suicidal Plans: []  []    Suicidal Intent: []  []    Suicide Attempts: []  []    Self-Injury []  []       For any boxes checked above, provide detail: Patient states, he hears voices that are telling him to harm himself. Suicidal ideation, no plan    History of suicide by family member: no  History of suicide by friend/significant other: no  Recent change in frequency/specificity/intensity of suicidal thoughts or self-harm behavior? yes - today  Current access to firearms, medications, or other identified means of suicide/self-harm? no  If yes, willing to restrict access to means of suicide/self-harm? no  Protective factors present:  Willing to address in treatment    SAFETY ASSESSMENT - OTHERS  Does patient acknowledge current or past symptoms of aggressive behavior or risk to others? no  Does parent/significant other report patient has current or past symptoms of aggressive behavior or risk to others?  N\A  Does presenting problem suggest symptoms of dangerousness to others? No    Crisis Safety Plan completed and copy given to patient? no    ABUSE/NEGLECT SCREENING  Does patient report feeling “unsafe” in his/her home, or afraid of anyone?  no  Does patient report any history of physical, sexual, or emotional abuse?  no  Does parent or significant other report any of the above? N\A  Is there evidence of neglect by self?  no  Is there evidence of neglect by a caregiver? no  Does the patient/parent report any history of CPS/APS/police involvement related to suspected abuse/neglect or domestic violence? no  Based on the information provided during the current assessment, is a mandated report of suspected abuse/neglect being made?  No    SUBSTANCE USE SCREENING  Yes:  Rajan all substances used in the past 30 days: Unable to get clear history. Released from long term today after 10 days. UDS and POC both negative       UDS results: Negative   Breathalyzer results: Negative    What consequences does the patient associate with any of the above substance use and or addictive behaviors? None    Risk factors for detox (check all  that apply):  []  Seizures   []  Diaphoretic (sweating)   []  Tremors   []  Hallucinations   []  Increased blood pressure   []  Decreased blood pressure   []  Other   [x]  None      [] Patient education on risk factors for detoxification and instructed to return to ER as needed.      MENTAL STATUS   Participation: Limited verbal participation and Guarded  Grooming: Casual  Orientation: Alert and Evidence of hallucinations present  Behavior: Unusual behaviors noted  Eye contact: Limited  Mood: Euthymic  Affect: Constricted  Thought process: Flight of ideas  Thought content: Unable to fully assess  Speech: Volume within normal limits and Latency of response  Perception: Evidence of auditory hallucination  Memory:  No gross evidence of memory deficits  Insight: Limited  Judgment:  Limited  Other:    Collateral information:   Source:  [] Significant other present in person:   [] Significant other by telephone  [] [facility]   [x] [facility]  [x] [facility]  [x] Other: ERP    [] Unable to complete full assessment due to:  [] Acute intoxication  [] Patient declined to participate/engage  [] Patient verbally unresponsive  [] Significant cognitive deficits  [] Significant perceptual distortions or behavioral disorganization  [] Other:      CLINICAL IMPRESSIONS:  Primary:  Suicidal ideation  Secondary:  Auditory stimuli       IDENTIFIED NEEDS/PLAN:  [Trigger DISPOSITION list for any items marked]    [x]  Imminent safety risk - self [] Imminent safety risk - others   []  Acute substance withdrawal [x]  Psychosis/Impaired reality testing   [x]  Mood/anxiety []  Substance use/Addictive behavior   []  Maladaptive behaviro []  Parent/child conflict   []  Family/Couples conflict []  Biomedical   []  Housing []  Financial   []   Legal  Occupational/Educational   []  Domestic violence []  Other:     Recommended Plan of Care:  Actively being addressed by Legal Hold and [facility] and 1:1  Observation    Does patient express agreement with the above plan? yes    Referral appointment(s) scheduled? no    Alert team only:   I have discussed findings and recommendations with Dr. Hou who is in agreement with these recommendations.     Referral information sent to the following community providers :    If applicable : Referred  to : [facility] for legal hold follow up at (time): Pending medical clearance      Shravan Ward R.N.  1/28/2021

## 2021-01-29 NOTE — ED PROVIDER NOTES
"ED Provider Note    Scribed for Huber Hou M.D. by Anna Lopez. 1/28/2021  8:56 PM    Primary care provider: No primary care provider noted.  Means of arrival: EMS  History obtained from: Patient  History limited by: None    CHIEF COMPLAINT  Chief Complaint   Patient presents with   • Legal 2000     Pt transferred from detention for SI   • Suicidal Ideation     No current plan       HPI  Boaz Reis is a 20 y.o. male who presents to the Emergency Department via EMS for evaluation of acute suicidal ideation. He presents from detention. When asked why he is here he responds \"can I have a sandwich?\" Additionally he notes that he is having auditory hallucinations that are telling him to hurt himself. He does not have a plan to end his life, but plans to if help not recieved. No homicidal ideation. He has been diagnosed with schizophrenia and is currently not on any medications. He denies cough, fever, shortness of breath, abdominal pain, and rashes.    REVIEW OF SYSTEMS  Pertinent positives include: suicidal ideation and auditory hallucinations. Pertinent negatives include: no homicidal ideation, cough, fever, shortness of breath, abdominal pain, and rashes. See history of present illness. All other systems are negative.     PAST MEDICAL HISTORY       SURGICAL HISTORY  patient denies any surgical history    SOCIAL HISTORY  Social History     Tobacco Use   • Smoking status: Current Some Day Smoker     Types: Cigarettes   • Smokeless tobacco: Never Used   Substance Use Topics   • Alcohol use: Yes     Comment: occ   • Drug use: Yes     Comment: meth       Social History     Substance and Sexual Activity   Drug Use Yes    Comment: meth        FAMILY HISTORY  Family History   Problem Relation Age of Onset   • Hypertension Father        CURRENT MEDICATIONS  Home Medications     Reviewed by Flakita Finn R.N. (Registered Nurse) on 01/28/21 at 2047  Med List Status: Partial   Medication Last Dose Status   ARIPiprazole " "(ABILIFY) 10 MG Tab  Active                ALLERGIES  No Known Allergies    PHYSICAL EXAM  VITAL SIGNS: /63   Pulse 64   Temp 36.6 °C (97.9 °F) (Temporal)   Resp 18   Ht 1.727 m (5' 8\")   Wt 83 kg (182 lb 15.7 oz)   SpO2 99%   BMI 27.82 kg/m²     Constitutional: Alert in no apparent distress.  HENT: No signs of trauma, Bilateral external ears normal, Nose normal. Uvula midline.   Eyes: Pupils are equal and reactive, Conjunctiva normal, Non-icteric.   Neck: Normal range of motion, No tenderness, Supple, No stridor.   Lymphatic: No lymphadenopathy noted.   Cardiovascular: Regular rate and rhythm, no murmurs.   Thorax & Lungs: Normal breath sounds, No respiratory distress, No wheezing, No chest tenderness.   Abdomen:  Soft, No tenderness, No peritoneal signs, No masses, No pulsatile masses.   Skin: Warm, Dry, No erythema, No rash.   Back: No bony tenderness, No CVA tenderness.   Extremities: Intact distal pulses, No edema, No tenderness, No cyanosis.  Musculoskeletal: Good range of motion in all major joints. No tenderness to palpation or major deformities noted.   Neurologic: Alert , Normal motor function, Normal sensory function, No focal deficits noted.   Psychiatric: Suicidal, responding to internal stimuli, tangential behavior    DIAGNOSTIC STUDIES / PROCEDURES    LABS  Labs Reviewed   POC BREATHALIZER - Normal   URINE DRUG SCREEN      All labs reviewed by me.    COURSE & MEDICAL DECISION MAKING  Nursing notes, VS, PMSFHx reviewed in chart.    20 y.o. male p/w chief complaint of suicidal ideation.    8:56 PM Patient seen and examined at bedside.      I verified that the patient was wearing a mask and I was wearing appropriate PPE every time I entered the room. The patient's mask was on the patient at all times during my encounter except for a brief view of the oropharynx.     The differential diagnoses include but are not limited to:     pt placed on hold due to suicidal ideation  No homicidal " ideation.  No obvious medial cause of patient's complaints, appropriate for psychiatric evaluation at this time.    12:49 AM Patient was reevaluated at bedside. He is amenable to transfer to a psychiatric facility. He will be evaluated by behavioral health.    DISPOSITION:  Patient will be transferred to an appropriate psychiatric facility and s/o to Dr. Murguia pending transfer    FINAL IMPRESSION  1. Suicidal ideation          Anna DUARTE (Srinivasibe), am scribing for, and in the presence of, Huber Hou M.D..    Electronically signed by: Anna Lopez (Scribe), 1/28/2021    IHuber M.D. personally performed the services described in this documentation, as scribed by Anna Lopez in my presence, and it is both accurate and complete.    The note accurately reflects work and decisions made by me.  Huber Hou M.D.  1/29/2021  2:44 AM'

## 2021-01-29 NOTE — DISCHARGE PLANNING
Medical Social Work    Referral: Legal Hold    Intervention: Legal Hold Paperwork given to SW by Life Skills RN Shravan    Legal Hold Initiated: Date: 1/28/21 Time: 1215    Patient’s Insurance Listed on Face Sheet: [facility] and [facility]    Referrals sent to: [facility] [place], [facility], [facility] and [facility].     This referral contains the following information:  1) Face sheet __x__  2) Page 1 and Page 2 of Legal Hold __x__  3) Alert Team Assessment/Psych Assessment __x__  4) Head to toe physical exam __x__  5) Urine Drug Screen __x__  6) Blood Alcohol __x__  7) Vital signs _x___  8) Pregnancy test when applicable __na_  9) Medications list __x__    Plan: Patient will transfer to mental health facility once acceptance is obtained

## 2021-01-29 NOTE — CONSULTS
PSYCHIATRY CONSULT TEAM NOTE: Consult received. Patient has been accepted by [facility]. Consult cancelled.     Thank you.

## 2021-01-29 NOTE — DISCHARGE PLANNING
Medical Social Work     Pt has been accepted to [facility] by Dr. Underwood.  [place] called [facility] and set up transport for 0830.  Sw updated  bedside rn, and completed transfer packet.

## 2021-01-29 NOTE — ED PROVIDER NOTES
ED Provider Note    4:19 AM Patient reevaluated.  Patient is on a legal hold, waiting transfer to psychiatric facility when a bed is available.  Please refer to initial note for complete details.  No concerns overnight.  Patient ambulates to the bathroom independently and tolerated a meal.  Medication reconciliation has been reviewed, home medication (Abilify) renewed.

## 2021-02-15 ENCOUNTER — HOSPITAL ENCOUNTER (EMERGENCY)
Facility: MEDICAL CENTER | Age: 21
End: 2021-02-15
Attending: EMERGENCY MEDICINE
Payer: COMMERCIAL

## 2021-02-15 VITALS
TEMPERATURE: 97.7 F | WEIGHT: 182.98 LBS | HEART RATE: 61 BPM | HEIGHT: 72 IN | DIASTOLIC BLOOD PRESSURE: 72 MMHG | BODY MASS INDEX: 24.78 KG/M2 | RESPIRATION RATE: 16 BRPM | SYSTOLIC BLOOD PRESSURE: 124 MMHG

## 2021-02-15 DIAGNOSIS — R45.851 SUICIDAL IDEATION: ICD-10-CM

## 2021-02-15 DIAGNOSIS — F20.9 SCHIZOPHRENIA, UNSPECIFIED TYPE (HCC): ICD-10-CM

## 2021-02-15 DIAGNOSIS — Z91.148 NONCOMPLIANCE WITH MEDICATION REGIMEN: ICD-10-CM

## 2021-02-15 LAB
AMPHET UR QL SCN: NEGATIVE
BARBITURATES UR QL SCN: NEGATIVE
BENZODIAZ UR QL SCN: NEGATIVE
BZE UR QL SCN: NEGATIVE
CANNABINOIDS UR QL SCN: NEGATIVE
FLUAV RNA SPEC QL NAA+PROBE: NEGATIVE
FLUBV RNA SPEC QL NAA+PROBE: NEGATIVE
METHADONE UR QL SCN: NEGATIVE
OPIATES UR QL SCN: NEGATIVE
OXYCODONE UR QL SCN: NEGATIVE
PCP UR QL SCN: NEGATIVE
POC BREATHALIZER: 0 PERCENT (ref 0–0.01)
PROPOXYPH UR QL SCN: NEGATIVE
RSV RNA SPEC QL NAA+PROBE: NEGATIVE
SARS-COV-2 RNA RESP QL NAA+PROBE: NOTDETECTED
SPECIMEN SOURCE: NORMAL

## 2021-02-15 PROCEDURE — 99285 EMERGENCY DEPT VISIT HI MDM: CPT

## 2021-02-15 PROCEDURE — 0241U HCHG SARS-COV-2 COVID-19 NFCT DS RESP RNA 4 TRGT MIC: CPT

## 2021-02-15 PROCEDURE — 302970 POC BREATHALIZER

## 2021-02-15 PROCEDURE — 700102 HCHG RX REV CODE 250 W/ 637 OVERRIDE(OP): Performed by: EMERGENCY MEDICINE

## 2021-02-15 PROCEDURE — 302970 POC BREATHALIZER: Performed by: EMERGENCY MEDICINE

## 2021-02-15 PROCEDURE — 80307 DRUG TEST PRSMV CHEM ANLYZR: CPT

## 2021-02-15 PROCEDURE — A9270 NON-COVERED ITEM OR SERVICE: HCPCS | Performed by: EMERGENCY MEDICINE

## 2021-02-15 PROCEDURE — C9803 HOPD COVID-19 SPEC COLLECT: HCPCS | Performed by: EMERGENCY MEDICINE

## 2021-02-15 PROCEDURE — 90791 PSYCH DIAGNOSTIC EVALUATION: CPT

## 2021-02-15 RX ORDER — LORAZEPAM 2 MG/1
2 TABLET ORAL ONCE
Status: COMPLETED | OUTPATIENT
Start: 2021-02-15 | End: 2021-02-15

## 2021-02-15 RX ORDER — HALOPERIDOL 5 MG/1
5 TABLET ORAL ONCE
Status: COMPLETED | OUTPATIENT
Start: 2021-02-15 | End: 2021-02-15

## 2021-02-15 RX ADMIN — HALOPERIDOL 5 MG: 5 TABLET ORAL at 12:26

## 2021-02-15 RX ADMIN — LORAZEPAM 2 MG: 2 TABLET ORAL at 12:26

## 2021-02-15 ASSESSMENT — FIBROSIS 4 INDEX: FIB4 SCORE: 0.27

## 2021-02-15 NOTE — ED NOTES
Pts belongings inventoried by security, 2 items w/ face-sheets stored in locker 2. Pt placed on L2K. Pt is high risk for SI.

## 2021-02-15 NOTE — CONSULTS
"[facility]   TRIAGE ASSESSMENT    Name: Boaz Reis  MRN: [id number]  : 2000  Age: 20 y.o.  Date of assessment: 2/15/2021  PCP: No primary care provider on file.  Persons in attendance: Patient    CHIEF COMPLAINT/PRESENTING ISSUE   Chief Complaint   Patient presents with   • Suicidal Ideation     x 2days; pt has hx of SI and schizophenia      Pt alert and oriented x4; denies HI and hallucinations.  Flat affect, pt answering \"no\" and \"never\" to multiple questions about his current and past symptoms.  He reports active SI with intent but no plan; cannot contract for safety.  Says he has been having SI x2 days, for no particular reason, \"just living.\"  Says he has never had AH/CAH; verified in past charting to have had both.  Says he has never been to an inpt psych hospital.  Verified in chart we sent him to [facility] 2.5 weeks ago, as well as several times in the last 2 years.      When asked how long he was recently at [facility], said 2 months.  He was only sent 2 weeks ago.    Once reminded about past inpt tx, when asked where he was supposed to follow up, says no one ever told him where to follow up.  Says he has been off his psych meds \"for a while,\" but isn't able to tell me who prescribes them or what med they were.  \"Haldol works pretty good.\"  Says his parents couldn't afford them.    He is a poor historian with delayed responses.  Calm and cooperative.    CURRENT LIVING SITUATION/SOCIAL SUPPORT: Pt reports he is still living with his father in [place].  Denies work or school at this time.      Of note, from chart review:  First encounter in EMR from 2013; pt 12 yrs old.  Well child visit with no outstanding PMH; no psych hx or meds noted.  Homeschooled til 13 yrs old, then to public school.     10/23/19: ER for AMS, psychosis at 18 yrs old.  BIB his brother; responding to internal stim, admitted to  but denied commanding.  Mother reported pt acting \"out of the norm,\" staring off, " "unresponsive/minimally interactive at times, inappropriate laughter.  Negative etoh/UDS.  Pt had been \"out on the streets for a few days;\" dehydrated and weathered skin.  Sent to [facility].    6/2/20: ER for psych eval.  Negative UDS/etoh.  Off psych meds d/t \"feeling better.\"  Family had called 911 d/t pt acting erratically.  Pt internally stimulated.  Sent to .    6/9/20: ER for HI.  Internally stimulated; negative UDS/etoh.  Sent to [facility].    6/17/20: ER for hallucinations.  Said he was dc'd from [facility] that day.  Found to be unable to care for himself d/t psychosis by the ERP.  After meeting with Alert Team, pt dc'd to self to seek voluntary admission to [facility].  Sent via cab.    7/8/20: ER for HI/CAH telling him to kill others.  Said he had just gotten out of .  Living with his father.  Negative UDS and etoh.  Sent to [facility].    1/28/21: ER for SI; came from custodial.  Reported CAH telling him to kill himself.  Off meds; prescribed Abilify.  Internally stimulated.  Sent to [facility].    BEHAVIORAL HEALTH TREATMENT HISTORY  Does patient/parent report a history of prior behavioral health treatment for patient?   Yes:    Dates Level of Care Facilty/Provider Diagnosis/Problem Medications                 1/29/21  7/9/20  6/10/20  10/23/19 inpt [facility] SI, HI, CAH schizophr.           7/2020 6/30/20 inpt  \"   \"                                         Unknown if pt established with any outpt providers.    SAFETY ASSESSMENT - SELF  Does patient acknowledge current or past symptoms of dangerousness to self? yes  Does parent/significant other report patient has current or past symptoms of dangerousness to self? N\A  Does presenting problem suggest symptoms of dangerousness to self? Yes:     Past Current    Suicidal Thoughts: [x]  [x]    Suicidal Plans: []  []    Suicidal Intent: []  []    Suicide Attempts: []  []    Self-Injury []  []      For any boxes checked above, provide detail: No past SA.  Past SI noted in chart.  Current SI " reported by pt.  Recent change in frequency/specificity/intensity of suicidal thoughts or self-harm behavior? yes - increased x2 days  Protective factors present:  Strong family connections and Willing to address in treatment    SAFETY ASSESSMENT - OTHERS  Does patient acknowledge current or past symptoms of aggressive behavior or risk to others? no  Does parent/significant other report patient has current or past symptoms of aggressive behavior or risk to others?  n/a  Does presenting problem suggest symptoms of dangerousness to others? No    Crisis Safety Plan completed and copy given to patient? no    ABUSE/NEGLECT SCREENING  Does patient report feeling “unsafe” in his/her home, or afraid of anyone?  no  Does patient report any history of physical, sexual, or emotional abuse?  no  Does parent or significant other report any of the above? N\A  Is there evidence of neglect by self?  no  Is there evidence of neglect by a caregiver? no  Does the patient/parent report any history of CPS/APS/police involvement related to suspected abuse/neglect or domestic violence? no  Based on the information provided during the current assessment, is a mandated report of suspected abuse/neglect being made?  No    SUBSTANCE USE SCREENING  Denies all.  No hx of use noted in any past charting.  Negative UDS and etoh.      MENTAL STATUS   Participation: Active verbal participation and Attentive  Grooming: Casual  Orientation: Alert and Fully Oriented  Behavior: Calm  Eye contact: Limited  Mood: Depressed  Affect: Flat  Thought process: Goal-directed  Thought content: Within normal limits  Speech: Soft and Latency of response  Perception: Within normal limits  Memory:  Poor memory for chronology of events  Insight: Limited  Judgment:  Limited  Other:    Collateral information: past EMR  Source:  [] Significant other present in person:   [] Significant other by telephone  [] [facility]   [] [facility]  [x] [facility] Record     CLINICAL IMPRESSIONS:  Primary:  SI  Secondary:  CAH       IDENTIFIED NEEDS/PLAN:  [Trigger DISPOSITION list for any items marked]    [x]  Imminent safety risk - self [] Imminent safety risk - others   []  Acute substance withdrawal []  Psychosis/Impaired reality testing   [x]  Mood/anxiety []  Substance use/Addictive behavior   [x]  Maladaptive behaviro []  Parent/child conflict   []  Family/Couples conflict []  Biomedical   []  Housing []  Financial   []   Legal  Occupational/Educational   []  Domestic violence []  Other:     Recommended Plan of Care:  Actively being addressed by Legal Hold and [facility] and 1:1 Observation   Pt scores HIGH on [place] screening and requires 1:1 observation.      Does patient express agreement with the above plan? yes    Referral appointment(s) scheduled? N\A    Alert team only: PT to remain on LH for SI.  I have discussed findings and recommendations with Dr. Monroe, who is in agreement with these recommendations.     Referral information sent to the following community providers : per     If applicable : Referred  to : [facility] for legal hold follow up at (time): 1:30pm      Chayo Charles R.N.  2/15/2021

## 2021-02-15 NOTE — ED PROVIDER NOTES
ED Provider Note    Scribed for Santino Monroe M.D. by Racheal Sullivan. 2/15/2021  11:06 AM    Primary care provider: None reported.  Means of arrival: EMS  History obtained from: patient  History limited by: none    CHIEF COMPLAINT  Chief Complaint   Patient presents with   • Suicidal Ideation     x 2days; pt has hx of SI and schizophenia       HPI  Boaz Reis is a 20 y.o. male with a history of suicidal ideation and schizophrenia who presents to the Emergency Department via EMS for suicidal ideation onset 2 days ago. He was seen at [facility] 2 weeks ago and was not treated with any medication. Patient does not have a plan for suicide.     REVIEW OF SYSTEMS  Pertinent positives include suicidal ideation. See HPI for further details.       PAST MEDICAL HISTORY   Schizonphrenia    SURGICAL HISTORY  patient denies any surgical history    SOCIAL HISTORY  Social History     Tobacco Use   • Smoking status: Current Some Day Smoker     Types: Cigarettes   • Smokeless tobacco: Never Used   Substance Use Topics   • Alcohol use: Yes     Comment: occ   • Drug use: Yes     Comment: meth         FAMILY HISTORY  Family History   Problem Relation Age of Onset   • Hypertension Father        CURRENT MEDICATIONS  Patient is not currently taking medication.     ALLERGIES  No Known Allergies    PHYSICAL EXAM  VITAL SIGNS: /73   Pulse 66   Temp 37 °C (98.6 °F) (Temporal)   Resp 16   Ht 1.829 m (6')   Wt 83 kg (182 lb 15.7 oz)   BMI 24.82 kg/m²     Nursing note and vitals reviewed.  Constitutional: No distress.   HENT: Head is atraumatic. Oropharynx is moist.   Eyes: Conjunctivae are normal. Pupils are equal, round, and reactive to light.   Cardiovascular: Normal peripheral perfusion  Respiratory: No respiratory distress.   Musculoskeletal: Normal range of motion.   Neurological: Alert. No focal deficits noted.    Skin: No rash.   Psych: Flat affect, depressed mood, confirms suicidal ideation.     DIAGNOSTIC  STUDIES / PROCEDURES    LABS  Results for orders placed or performed during the hospital encounter of 02/15/21   POC BREATHALIZER   Result Value Ref Range    POC Breathalizer 0.000 0.00 - 0.01 Percent   POC Breathalizer   Result Value Ref Range    POC Breathalizer 0 0.00 - 0.01 Percent   POC BREATHALIZER   Result Value Ref Range    POC Breathalizer 0.00 0.00 - 0.01 Percent      All labs reviewed by me.    COURSE & MEDICAL DECISION MAKING  Nursing notes, VS, PMSFHx reviewed in chart.     Review of past medical records shows the patient was recently seen here for suicidal ideation. He was transferred to [facility] on 1/29/21.      11:06 AM - Patient seen and examined at bedside. Patient will be treated with Haldol 5 mg and Ativan 2 mg. Ordered urine drug screen and POC breathalizer to evaluate his symptoms. Patient will be evaluated by behavioral health.     Patient was seen by life skills and we both agree patient would benefit from inpatient psychiatric care. Legal hold completed.     FINAL IMPRESSION  1. Suicidal ideation    2. Schizophrenia, unspecified type (HCC)    3. Noncompliance with medication regimen          Racheal DUARTE (Fco), am scribing for, and in the presence of, Santino Monroe M.D..    Electronically signed by: Racheal Sullivan (Fco), 2/15/2021    Santino DUARTE M.D. personally performed the services described in this documentation, as scribed by Racheal Sullivan in my presence, and it is both accurate and complete. E    The note accurately reflects work and decisions made by me.  Santino Monroe M.D.  2/15/2021  2:22 PM

## 2021-02-15 NOTE — DISCHARGE PLANNING
Alert Team  Consult order noted.  Pt not currently ready for consult.l  Pt will need:  -legal sobriety noted in results.    Once pt has been seen by ERP and is legally sober, RN to contact AT.    Please have PAR complete his registration prior to consult and send UDS asap.

## 2021-02-15 NOTE — DISCHARGE PLANNING
SW received copy of Pt Legal Hold from Alert Team Chayo Charles RN.     LIZ will send Mental Health referral when H&P and UDS are noted in Pt chart.     LIZ will monitor.

## 2021-02-15 NOTE — DISCHARGE PLANNING
Ruth from [facility] called and they will accept Pt.   Dr. Ortiz will be admitting physician.   They request a 1630 transportation time.     SW will arrange transportation and update RN.

## 2021-02-15 NOTE — DISCHARGE PLANNING
Medical Social Work    Referral: Legal Hold    Intervention: Legal Hold Paperwork given to SW by Life Skills RN Chayo Charles    Legal Hold Initiated: Date: 02-15-21 Time: 1130    Patient’s Insurance Listed on Face Sheet: [employer], Medicaid HMO    Referrals sent to: [place], [facility]     This referral contains the following information:  1) Face sheet __x__  2) Page 1 and Page 2 of Legal Hold __x_  3) Alert Team Assessment/Psych Assessment ___x_  4) Head to toe physical exam __x__  5) Urine Drug Screen __obtained pending results__  6) Blood Alcohol __x__  7) Vital signs __x__  8) Pregnancy test when applicable _NA__  9) Medications list __X__    Plan: Patient will transfer to mental health facility once acceptance is obtained

## 2021-02-16 NOTE — ED NOTES
Pt discharge to [facility]. Pt verbalized understanding, all questions answered. Pt steady gait during ambulation.

## 2021-02-23 ENCOUNTER — HOSPITAL ENCOUNTER (EMERGENCY)
Facility: MEDICAL CENTER | Age: 21
End: 2021-02-23
Attending: EMERGENCY MEDICINE
Payer: COMMERCIAL

## 2021-02-23 VITALS
SYSTOLIC BLOOD PRESSURE: 122 MMHG | HEART RATE: 82 BPM | TEMPERATURE: 98.2 F | HEIGHT: 72 IN | OXYGEN SATURATION: 97 % | DIASTOLIC BLOOD PRESSURE: 65 MMHG | RESPIRATION RATE: 18 BRPM | BODY MASS INDEX: 27.09 KG/M2 | WEIGHT: 200 LBS

## 2021-02-23 DIAGNOSIS — R45.851 SUICIDAL IDEATION: ICD-10-CM

## 2021-02-23 DIAGNOSIS — R44.3 HALLUCINATION: ICD-10-CM

## 2021-02-23 PROCEDURE — 90791 PSYCH DIAGNOSTIC EVALUATION: CPT

## 2021-02-23 PROCEDURE — 80307 DRUG TEST PRSMV CHEM ANLYZR: CPT

## 2021-02-23 PROCEDURE — 99285 EMERGENCY DEPT VISIT HI MDM: CPT

## 2021-02-23 PROCEDURE — 302970 POC BREATHALIZER: Performed by: EMERGENCY MEDICINE

## 2021-02-23 ASSESSMENT — LIFESTYLE VARIABLES
HAVE YOU EVER FELT YOU SHOULD CUT DOWN ON YOUR DRINKING: NO
CONSUMPTION TOTAL: NEGATIVE
DOES PATIENT WANT TO STOP DRINKING: NO
HAVE PEOPLE ANNOYED YOU BY CRITICIZING YOUR DRINKING: NO
TOTAL SCORE: 0
TOTAL SCORE: 0
HOW MANY TIMES IN THE PAST YEAR HAVE YOU HAD 5 OR MORE DRINKS IN A DAY: 0
ON A TYPICAL DAY WHEN YOU DRINK ALCOHOL HOW MANY DRINKS DO YOU HAVE: 0
TOTAL SCORE: 0
EVER FELT BAD OR GUILTY ABOUT YOUR DRINKING: NO
AVERAGE NUMBER OF DAYS PER WEEK YOU HAVE A DRINK CONTAINING ALCOHOL: 0
EVER HAD A DRINK FIRST THING IN THE MORNING TO STEADY YOUR NERVES TO GET RID OF A HANGOVER: NO
DO YOU DRINK ALCOHOL: NO

## 2021-02-23 ASSESSMENT — FIBROSIS 4 INDEX
FIB4 SCORE: 0.27
FIB4 SCORE: 0.27

## 2021-02-23 NOTE — ED TRIAGE NOTES
Patient brought in via EMS for SI/HI, patient reports worsening auditory command hallucinations. Pt reports taking haldol in the past but has been unable to afford medications. Security at bedside for 1:1 obs, all belongings locked. Legal hold enacted

## 2021-02-23 NOTE — DISCHARGE PLANNING
Medical Social Work    Referral: Legal Hold    Intervention: Legal Hold Paperwork given to SW by Life Skills RN Chayo Charles    Legal Hold Initiated: Date: 02/23/2021 Time: 1120    Patient’s Insurance Listed on Face Sheet: [facility]    Referrals sent to: [place] and [facility]    This referral contains the following information:  1) Face sheet __x__  2) Page 1 and Page 2 of Legal Hold _x___  3) Alert Team Assessment/Psych Assessment _x___  4) Head to toe physical exam __x__  5) Urine Drug Screen __x__  6) Blood Alcohol __x__  7) Vital signs _x___  8) Pregnancy test when applicable _N/A__  9) Medications list __N/A__    Plan: Patient will transfer to mental health facility once acceptance is obtained.

## 2021-02-23 NOTE — ED PROVIDER NOTES
"ED Provider Note    ER PROVIDER NOTE        CHIEF COMPLAINT  Chief Complaint   Patient presents with   • Suicidal Ideation     reports SI/HI worsening today, command hallucinations. Stopped taking haldol   • Homicidal Ideation       HPI  Boaz Reis is a 20 y.o. male who presents to the emergency department complaining of hallucinations, suicidal thoughts and homicidal thoughts.  Patient reports he is seeing \"death\", he states there are figures but he is unable to describe him any further.  No auditory hallucinations.  Nobody in particular that he would like to kill, and has no plan for his own suicidal thoughts.  He reports no other complaints, headaches, chest pain, recent illness fevers chills cough or congestion.  He did recently stop taking his Haldol    REVIEW OF SYSTEMS  Pertinent positives include HI, SI. Pertinent negatives include no chest pain. See HPI for details. All other systems reviewed and are negative.    PAST MEDICAL HISTORY       SURGICAL HISTORY  patient denies any surgical history    FAMILY HISTORY  Family History   Problem Relation Age of Onset   • Hypertension Father        SOCIAL HISTORY  Social History     Socioeconomic History   • Marital status: Single     Spouse name: Not on file   • Number of children: Not on file   • Years of education: Not on file   • Highest education level: Not on file   Occupational History   • Not on file   Tobacco Use   • Smoking status: Current Some Day Smoker     Types: Cigarettes   • Smokeless tobacco: Never Used   Substance and Sexual Activity   • Alcohol use: Yes     Comment: occ   • Drug use: Yes     Comment: meth    • Sexual activity: Never   Other Topics Concern   • Behavioral problems Not Asked   • Interpersonal relationships Not Asked   • Sad or not enjoying activities Not Asked   • Suicidal thoughts Not Asked   • Poor school performance Not Asked   • Reading difficulties Not Asked   • Speech difficulties Not Asked   • Writing difficulties Not Asked "   • Inadequate sleep Not Asked   • Excessive TV viewing Not Asked   • Excessive video game use Not Asked   • Inadequate exercise Not Asked   • Sports related Not Asked   • Poor diet Not Asked   • Family concerns for drug/alcohol abuse Not Asked   • Poor oral hygiene Not Asked   • Bike safety Not Asked   • Family concerns vehicle safety Not Asked   Social History Narrative   • Not on file     Social Determinants of Health     Financial Resource Strain:    • Difficulty of Paying Living Expenses:    Food Insecurity:    • Worried About Running Out of Food in the Last Year:    • Ran Out of Food in the Last Year:    Transportation Needs:    • Lack of Transportation (Medical):    • Lack of Transportation (Non-Medical):    Physical Activity:    • Days of Exercise per Week:    • Minutes of Exercise per Session:    Stress:    • Feeling of Stress :    Social Connections:    • Frequency of Communication with Friends and Family:    • Frequency of Social Gatherings with Friends and Family:    • Attends [employer] Services:    • Active Member of Clubs or Organizations:    • Attends Club or Organization Meetings:    • Marital Status:    Intimate Partner Violence:    • Fear of Current or Ex-Partner:    • Emotionally Abused:    • Physically Abused:    • Sexually Abused:       Social History     Substance and Sexual Activity   Drug Use Yes    Comment: meth        CURRENT MEDICATIONS  Home Medications    **Home medications have not yet been reviewed for this encounter**         ALLERGIES  No Known Allergies    PHYSICAL EXAM  VITAL SIGNS: /65   Pulse 74   Temp 36.4 °C (97.6 °F) (Temporal)   Resp 16   Ht 1.829 m (6')   Wt 90.7 kg (200 lb)   SpO2 96%   BMI 27.12 kg/m²   Pulse ox interpretation: I interpret this pulse ox as normal.    Constitutional: Alert in no apparent distress.  HENT: No signs of trauma, Bilateral external ears normal, Nose normal.   Eyes: Pupils are equal and reactive, Conjunctiva normal, Non-icteric.    Neck: Normal range of motion, No tenderness, Supple, No stridor.   Lymphatic: No lymphadenopathy noted.   Cardiovascular: Regular rate and rhythm, no murmurs.   Thorax & Lungs: Normal breath sounds, No respiratory distress, No wheezing, No chest tenderness.   Abdomen: Bowel sounds normal, Soft, No tenderness, No masses, No pulsatile masses. No peritoneal signs.  Skin: Warm, Dry, No erythema, No rash.   Back: No bony tenderness, No CVA tenderness.   Extremities: Intact distal pulses, No edema, No tenderness, No cyanosis, Negative Prateek's sign.  Musculoskeletal: Good range of motion in all major joints. No tenderness to palpation or major deformities noted.   Neurologic: Alert , Normal motor function, Normal sensory function, No focal deficits noted.   Psychiatric: Withdrawn, poor eye contact      DIAGNOSTIC STUDIES / PROCEDURES        LABS  Labs Reviewed   POC BREATHALIZER - Normal   URINE DRUG SCREEN       All labs reviewed by me.    RADIOLOGY  No orders to display     The radiologist's interpretation of all radiological studies have been reviewed and images independently viewed by me.    COURSE & MEDICAL DECISION MAKING  Nursing notes, VS, PMSFHx reviewed in chart.    11:25 AM Patient seen and examined at bedside.  Ordered urine drug screen, breathalyzer, we will have behavioral health consult        Decision Making:  This is a 20 y.o. male presenting with apparent hallucinations as well as homicidal and suicidal thoughts.  Patient does appear to be decompensated unable to care for himself with these and as such a legal hold has been initiated.  Does not have any other focal medical complaints or findings on exam to suggest other acute medical pathology and he is medically cleared.    He is signed out to the oncoming emergency physician pending transfer to inpatient psychiatric facility        FINAL IMPRESSION  1. Suicidal ideation    2. Hallucination         The note accurately reflects work and decisions made  by me.  Zacarias Sawyer M.D.  2/23/2021  1:47 PM

## 2021-02-23 NOTE — DISCHARGE PLANNING
Alert Team  Consult order noted.  Pt will need:  - legal sobriety noted in results  - PAR to complete his registration.    Please try to send UDS prior to consult.

## 2021-02-23 NOTE — CONSULTS
"[facility]   TRIAGE ASSESSMENT    Name: Boaz Reis  MRN: [id number]  : 2000  Age: 20 y.o.  Date of assessment: 2021  PCP: No primary care provider on file.  Persons in attendance: Patient    CHIEF COMPLAINT/PRESENTING ISSUE   Chief Complaint   Patient presents with   • Suicidal Ideation     reports SI/HI worsening today, command hallucinations. Stopped taking haldol   • Homicidal Ideation      Per triage note, \"Patient brought in via EMS for SI/HI, patient reports worsening auditory command hallucinations. Pt reports taking haldol in the past but has been unable to afford medications.\"  Bedside RN reports patient asked staff at [facility] to call 911 for him.    I did a consult with this pt 2/15/2021, 8 days ago.  He was sent to [facility].  Per Sidney in admission at [facility], pt was dc'd from there yesterday.    I tried to contact his emergency contact, his father Barrett at [phone].  A recording said the number is not taking calls; no voicemail available.  Unable to confirm if pt still living with him.    Upon my assessment, pt a+ox4.  Reports SI and HI, but unable to say when it began.  I asked if he had these feelings yesterday when getting DC'd from [facility].  His answer, both times I asked, was \"it started with the pharmaceuticals.\"  I asked what he did after DC from [facility] yesterday and he said \"I was outside.\"  Says he didn't go home.  I asked why his dad didn't pick him up at [place].  He says d/t work.  He is unable to tell me who he was supposed to f/u with or what meds he is on.  \"Haldol works well.  But I can't afford it.\"  Says he is having CAH telling him to kill other people, no one in particular.  Says he is having SI to \"carpio a vein\" or strangle himself.  Says he is having VH of \"sharp objects.\"  Flat affect, mildly disorganized with delayed responses.      CURRENT LIVING SITUATION/SOCIAL SUPPORT: Pt reports he is still living with his father in [place].  Denies work or school at this " "time.        Of note, from chart review:  First encounter in EMR from 6/2013; pt 12 yrs old.  Well child visit with no outstanding PMH; no psych hx or meds noted.  Homeschooled til 13 yrs old, then to public school.      10/23/19: ER for AMS, psychosis at 18 yrs old.  BIB his brother; responding to internal stim, admitted to  but denied commanding.  Mother reported pt acting \"out of the norm,\" staring off, unresponsive/minimally interactive at times, inappropriate laughter.  Negative etoh/UDS.  Pt had been \"out on the streets for a few days;\" dehydrated and weathered skin.  Sent to [facility].     6/2/20: ER for psych eval.  Negative UDS/etoh.  Off psych meds d/t \"feeling better.\"  Family had called 911 d/t pt acting erratically.  Pt internally stimulated.  Sent to .     6/9/20: ER for HI.  Internally stimulated; negative UDS/etoh.  Sent to [facility].     6/17/20: ER for hallucinations.  Said he was dc'd from [facility] that day.  Found to be unable to care for himself d/t psychosis by the ERP.  After meeting with Alert Team, pt dc'd to self to seek voluntary admission to [facility].  Sent via cab.     7/8/20: ER for HI/CAH telling him to kill others.  Said he had just gotten out of .  Living with his father.  Negative UDS and etoh.  Sent to [facility].     1/28/21: ER for SI; came from [facility].  Reported CAH telling him to kill himself.  Off meds; prescribed Abilify.  Internally stimulated.  Sent to [facility].    2/15/2021: ER for SI; off meds.  Reported he was not treated with any meds last time at [facility].  Said no one told him to f/u anywhere.  Sent to [facility].     BEHAVIORAL HEALTH TREATMENT HISTORY  Does patient/parent report a history of prior behavioral health treatment for patient?   Yes:    Dates Level of Care Facilty/Provider Diagnosis/Problem Medications                           2/15-2/22/2021  1/29/21  7/9/20  6/10/20  10/23/19 inpt [facility] SI, HI, CAH schizophr.                 7/2020 6/30/20 inpt  \"   \"                                   "                                  Unknown if pt established with any outpt providers.  Pt is a poor historian and unable to reach his father for collateral info.      SAFETY ASSESSMENT - SELF  Does patient acknowledge current or past symptoms of dangerousness to self? yes  Does parent/significant other report patient has current or past symptoms of dangerousness to self? N\A  Does presenting problem suggest symptoms of dangerousness to self? Yes:     Past Current    Suicidal Thoughts: [x]  [x]    Suicidal Plans: []  []    Suicidal Intent: []  []    Suicide Attempts: []  []    Self-Injury []  []      For any boxes checked above, provide detail: past and current SI; no past SA/SH.    Recent change in frequency/specificity/intensity of suicidal thoughts or self-harm behavior? Unable to say  Current access to firearms, medications, or other identified means of suicide/self-harm? no  Protective factors present:  Strong family connections and Willing to address in treatment    SAFETY ASSESSMENT - OTHERS  Does patient acknowledge current or past symptoms of aggressive behavior or risk to others? Yes.  Past HI.  No reports of aggressivity.  Does parent/significant other report patient has current or past symptoms of aggressive behavior or risk to others?  N\A  Does presenting problem suggest symptoms of dangerousness to others? Yes:    History Current   Thoughts of injuring others? [x]  []    Threats to injure others? []  []    Plan to injure others? []  []    Intent to injure others? []  []    Has injured others? []  []    Thoughts of killing others? [x]  []    Threats to kill others? []  []    Plans to kill others? []  []    Intent to kill others? []  []    Has killed others? []  []    Perpetrator of sexual assault? []  []    Family history of homicide? []  []      For any boxes checked above, please provide detail: Past HI.  Current HI d/t CAH telling him to kill others.    Recent change in frequency/specificity/intensity  of thoughts or threats to harm others? Pt unable to vocalize exactly how long  Protective factors present: Willing to address in treatment  Based on information provided during the current assessment, is a mandated “duty to warn” being exercised? No    Crisis Safety Plan completed and copy given to patient? N\A    ABUSE/NEGLECT SCREENING  Does patient report feeling “unsafe” in his/her home, or afraid of anyone?  no  Does patient report any history of physical, sexual, or emotional abuse?  no  Does parent or significant other report any of the above? N\A  Is there evidence of neglect by self?  no  Is there evidence of neglect by a caregiver? no  Does the patient/parent report any history of CPS/APS/police involvement related to suspected abuse/neglect or domestic violence? no  Based on the information provided during the current assessment, is a mandated report of suspected abuse/neglect being made?  No    SUBSTANCE USE SCREENING  Denies all.  No hx of use noted in any past charting.  Negative UDS and etoh.      MENTAL STATUS   Participation: Limited verbal participation  Grooming: Casual  Orientation: Alert and Fully Oriented  Behavior: Calm  Eye contact: Good  Mood: flat  Affect: Flat  Thought process: Goal-directed  Thought content: Preoccupation  Speech: Latency of response  Perception: pt reports AH/CAH/VH  Memory:  Poor memory for chronology of events  Insight: Poor  Judgment:  Limited  Other:    Collateral information: past EMR  Source:  [] Significant other present in person:   [] Significant other by telephone  [] [facility]   [] [facility] Nursing Staff  [x] [facility]     CLINICAL IMPRESSIONS:  Primary:  HI  Secondary:  SI       IDENTIFIED NEEDS/PLAN:  [Trigger DISPOSITION list for any items marked]    [x]  Imminent safety risk - self [x] Imminent safety risk - others   []  Acute substance withdrawal []  Psychosis/Impaired reality testing   [x]  Mood/anxiety []  Substance use/Addictive  behavior   [x]  Maladaptive behaviro []  Parent/child conflict   []  Family/Couples conflict []  Biomedical   []  Housing []  Financial   []   Legal  Occupational/Educational   []  Domestic violence []  Other:     Recommended Plan of Care:  Actively being addressed by Legal Hold and [facility] and 1:1 Observation   Pt scored HIGH on [place] screening and voices HI d/t CAH.  He will require 1:1 observation by security til otherwise ordered by psychiatry or AT.    Does patient express agreement with the above plan? yes    Referral appointment(s) scheduled? N\A    Alert team only: Pt to remain on LH for HI/SI with CAH.  I have discussed findings and recommendations with Dr. Sawyer, who is in agreement with these recommendations.     Referral information sent to the following community providers : per     If applicable : Referred  to : [facility] for legal hold follow up at (time): 1230      Chayo Charles R.N.  2/23/2021

## 2021-02-24 NOTE — DISCHARGE PLANNING
Medical Social Work    LSW received call from Adriana roque/ [facility] stating the pt has been accepted by Dr. Jones.     LSW called MTM and spoke bharathi Vega for transport authorization.     PCS form and Facesheet faxed to [facility]. Transportation arranged w/ Gera @ [facility] for 1630.     LSW completed transfer packet and placed original LH in packet and placed on pt's chart.     Bedside RN and [facility] notified of 1630 transport time.

## 2021-03-05 ENCOUNTER — HOSPITAL ENCOUNTER (EMERGENCY)
Facility: MEDICAL CENTER | Age: 21
End: 2021-03-05
Attending: EMERGENCY MEDICINE
Payer: COMMERCIAL

## 2021-03-05 VITALS
SYSTOLIC BLOOD PRESSURE: 102 MMHG | OXYGEN SATURATION: 97 % | BODY MASS INDEX: 27.09 KG/M2 | HEIGHT: 72 IN | WEIGHT: 200 LBS | TEMPERATURE: 98.8 F | HEART RATE: 78 BPM | RESPIRATION RATE: 18 BRPM | DIASTOLIC BLOOD PRESSURE: 58 MMHG

## 2021-03-05 DIAGNOSIS — F20.89 OTHER SCHIZOPHRENIA (HCC): ICD-10-CM

## 2021-03-05 DIAGNOSIS — T69.029A TRENCH FOOT, UNSPECIFIED LATERALITY, INITIAL ENCOUNTER: ICD-10-CM

## 2021-03-05 PROCEDURE — A9270 NON-COVERED ITEM OR SERVICE: HCPCS | Performed by: EMERGENCY MEDICINE

## 2021-03-05 PROCEDURE — 302970 POC BREATHALIZER: Performed by: EMERGENCY MEDICINE

## 2021-03-05 PROCEDURE — 96365 THER/PROPH/DIAG IV INF INIT: CPT

## 2021-03-05 PROCEDURE — 96367 TX/PROPH/DG ADDL SEQ IV INF: CPT

## 2021-03-05 PROCEDURE — 80307 DRUG TEST PRSMV CHEM ANLYZR: CPT

## 2021-03-05 PROCEDURE — 90791 PSYCH DIAGNOSTIC EVALUATION: CPT

## 2021-03-05 PROCEDURE — 99284 EMERGENCY DEPT VISIT MOD MDM: CPT

## 2021-03-05 PROCEDURE — 700102 HCHG RX REV CODE 250 W/ 637 OVERRIDE(OP): Performed by: EMERGENCY MEDICINE

## 2021-03-05 RX ORDER — ARIPIPRAZOLE 10 MG/1
10 TABLET ORAL DAILY
Status: DISCONTINUED | OUTPATIENT
Start: 2021-03-05 | End: 2021-03-05 | Stop reason: HOSPADM

## 2021-03-05 RX ADMIN — ARIPIPRAZOLE 10 MG: 10 TABLET ORAL at 18:50

## 2021-03-05 ASSESSMENT — FIBROSIS 4 INDEX: FIB4 SCORE: 0.27

## 2021-03-05 ASSESSMENT — LIFESTYLE VARIABLES: DO YOU DRINK ALCOHOL: NO

## 2021-03-05 NOTE — ED TRIAGE NOTES
"Chief Complaint   Patient presents with   • Hallucinations     Auditory and visual hallucinations   • Off Psych Meds     pt states he cannot afford his meds and has not taken them \"in a while\"     Pt bib for above complaint. Pt states he \"hears and sees things\". AOx4. VSS. Pt states he believes he used to be on Haldol, but has not taken that \"in a while\" due to not being able to afford them.    Resting comfortably with bed in lowest and locked position.   "

## 2021-03-06 ENCOUNTER — HOSPITAL ENCOUNTER (EMERGENCY)
Dept: HOSPITAL 8 - ED | Age: 21
Discharge: HOME | End: 2021-03-06
Payer: COMMERCIAL

## 2021-03-06 VITALS
SYSTOLIC BLOOD PRESSURE: 126 MMHG | DIASTOLIC BLOOD PRESSURE: 61 MMHG | HEIGHT: 72 IN | BODY MASS INDEX: 27.17 KG/M2 | WEIGHT: 200.62 LBS

## 2021-03-06 DIAGNOSIS — F41.1: Primary | ICD-10-CM

## 2021-03-06 DIAGNOSIS — R07.89: ICD-10-CM

## 2021-03-06 PROCEDURE — 71045 X-RAY EXAM CHEST 1 VIEW: CPT

## 2021-03-06 PROCEDURE — 99283 EMERGENCY DEPT VISIT LOW MDM: CPT

## 2021-03-06 NOTE — ED PROVIDER NOTES
"ED Provider Note    CHIEF COMPLAINT  Chief Complaint   Patient presents with    Hallucinations     Auditory and visual hallucinations    Off Psych Meds     pt states he cannot afford his meds and has not taken them \"in a while\"       HPI  Boaz Reis is a 20 y.o. male who presents to the emergency department with complaint of foot discomfort as well as visual hallucinations with baseline schizophrenia. States that he has not been on his medications due to cost. Was recently at [facility] for roughly a week to 1 1/2 weeks. States he was feeling better after the stay although we last 4072 hours she is not been taking his medications as he was financially unable to fill prescriptions. He was believe that his father would help him once he got out however this was not the case. He has been living on the streets. Denies any current suicidal homicidal ideations. Denies any current drug or alcohol use.    REVIEW OF SYSTEMS  See HPI for further details. All other systems are negative.     PAST MEDICAL HISTORY       SOCIAL HISTORY  Social History     Tobacco Use    Smoking status: Current Some Day Smoker     Types: Cigarettes    Smokeless tobacco: Never Used   Substance and Sexual Activity    Alcohol use: Not Currently    Drug use: Not Currently    Sexual activity: Never       SURGICAL HISTORY  patient denies any surgical history    CURRENT MEDICATIONS  Home Medications    **Home medications have not yet been reviewed for this encounter**         ALLERGIES  No Known Allergies    PHYSICAL EXAM  VITAL SIGNS: /58   Pulse 78   Temp 37.1 °C (98.8 °F) (Temporal)   Resp 18   Ht 1.829 m (6')   Wt 90.7 kg (200 lb)   SpO2 97%   BMI 27.12 kg/m²  @SAMANTA[[id number]::@  Pulse ox interpretation: I interpret this pulse ox as normal.  Constitutional: Alert in no apparent distress.  HENT: Normocephalic, Atraumatic, Bilateral external ears normal. Nose normal.   Eyes: Pupils are equal and reactive.   Heart: Regular rate and rythm  Lungs: " Clear to auscultation bilaterally.  Skin: Warm, Dry, sunburned face. Toes appear to be wet and with mild transferred secondary to his foot attire  Neurologic: Alert, Grossly non-focal.   Psychiatric: odd affect with no SI or HI      1830: discussed case with behavioral health services was now also evaluated the patient. At this point the patient will be referred back to a psychiatric care facility. He has been provided with Abilify here for single dose as he is not been on his medications for the last few days. At this point no need for legal hold.    COURSE & MEDICAL DECISION MAKING  Pertinent Labs & Imaging studies reviewed. (See chart for details)  patient presented emergency department with request for evaluation of his visual and auditory hallucinations as well as discomfort to his feet. I do believe his dermal changes to his feet are secondary to his poor foot care secondary to being on domicile than likely wet socks and heavy boots. I have given him instructions on how to improve this moving forward. No other acute needs from this respect. More so however is the patient's long-standing history of schizophrenia with hallucinations however he does not have any suicidal homicidal ideations and is able to care for self. At this point the patient has been provided with resources and will be discharged to home at this point.       The patient will return for worsening symptoms and is stable at the time of discharge. The patient verbalizes understanding and will comply.    FINAL IMPRESSION  1. Other schizophrenia (HCC)    2. Trench foot, unspecified laterality, initial encounter               Electronically signed by: El Perea M.D., 3/5/2021 5:12 PM

## 2021-03-06 NOTE — DISCHARGE PLANNING
LIZ asked to assist with arranging [facility], [place]).  LIZ called [facility] and arranged transportation with Henny.  Henny set up [facility] to  Pt in front of ER.  Cab is set to this SW when they arrive at the facility.     2000: Gio from [facility] called this SW and stated that they still have not been able to get a hold of [facility].  He stated that they will continue to try and will call this SW back as soon he is able to schedule the trip.

## 2021-03-06 NOTE — ED NOTES
Discharge instructions provided, pt verbalizes understanding. Pt is awake, alert, VSS. Pt ambulatory with steady gait out of ER. Pt taking MTM to [facility] shelter. Pt escorted out by Flori from alert team.

## 2021-03-06 NOTE — NUR
KEATON FROM [facility] IN THE BOX C/O "GENERAL ANXIETY WITH SHAKING, THEN I START 
CRAMPING UP", HX SAME, NON-COMPLIANT W MEDS ("I RAN OUT 10 DAYS AGO"), AOX4, 
DENIES CHEST PAIN & SI/HI; - NO OTHER INTERVENTIONS PTA PER EMS; PT 
CHANGED INTO GOWN, COOPERATIVE & RESPONDS APPROP TO STAFF, MONITORS IN PLACE, 
CALL LIGHT WITHIN REACH.

## 2021-03-06 NOTE — NUR
Patient given discharge instructions and they have confirmed that they 
understand the instructions.  Patient ambulatory with steady gait. Provided w/ 
taxi voucher.

## 2021-03-06 NOTE — NUR
PT LAYING ON GURNEY, CALMLY SLEEPING. FOOD TRAY GIVEN, PT DECLINED FOOD TRAY. 
NO NEEDS AT THIS TIME. CALL LIGHT WITHIN REACH.

## 2021-03-06 NOTE — CONSULTS
"[facility]   TRIAGE ASSESSMENT    Name: Boaz Reis  MRN: [id number]  : 2000  Age: 20 y.o.  Date of assessment: 3/5/2021  PCP: Pcp Pt States None  Persons in attendance: Patient    CHIEF COMPLAINT/PRESENTING ISSUE (as stated by patient): 20 year old male BIB self today d/t not taking meds, A/H, homeless; voluntary pt;  pt alert, oriented to person, place, date, some recent memory recall impairment; calm; cooperative; pleasant; with organized thoughts and behaviors but appears internally preoccupied; some thought blocking noted; no delusions noted; with generalized paranoia and states A/H and V/H hallucinations present; insight, judgment adequate;  Able to attend to ADL's and meet basic needs; denies SI, HI, or self-harm ideation; future-oriented; states he wants help with his medication, and housing, that his father won't let him live with him and has been walking without resources; states he has not f/u with outpt  providers; states he thinks his current psych med includes Abilify 10 mg PO daily, cannot recall last dose taken; with h/o inpt  tx including [facility] 2/15/2021, 2021, 2020, 2020, 10/2019, [facility] 2021, 2020; denies substance use; unemployed; states currently homeless    Pt received Abilify 10 mg PO today at 1850    Chief Complaint   Patient presents with   • Hallucinations     Auditory and visual hallucinations   • Off Psych Meds     pt states he cannot afford his meds and has not taken them \"in a while\"        CURRENT LIVING SITUATION/SOCIAL SUPPORT: currently homeless; previously staying with his father, Barrett    BEHAVIORAL HEALTH TREATMENT HISTORY  Does patient/parent report a history of prior behavioral health treatment for patient?   Yes:    Dates Level of Care Facilty/Provider Diagnosis/Problem Medications   2021, 2020, 2020 inpt [facility] Schizophrenia Abilify 10 mg PO daily    2/15/2021, 2021,2020, " 10/2019 inpt MH [facility]           SAFETY ASSESSMENT - SELF  Does patient acknowledge current or past symptoms of dangerousness to self? no  Does parent/significant other report patient has current or past symptoms of dangerousness to self? N\A  Does presenting problem suggest symptoms of dangerousness to self? No    SAFETY ASSESSMENT - OTHERS  Does patient acknowledge current or past symptoms of aggressive behavior or risk to others? no  Does parent/significant other report patient has current or past symptoms of aggressive behavior or risk to others?  N\A  Does presenting problem suggest symptoms of dangerousness to others? No    Crisis Safety Plan completed and copy given to patient? N\A    ABUSE/NEGLECT SCREENING  Does patient report feeling “unsafe” in his/her home, or afraid of anyone?  no  Does patient report any history of physical, sexual, or emotional abuse?  no  Does parent or significant other report any of the above? N\A  Is there evidence of neglect by self?  no  Is there evidence of neglect by a caregiver? no  Does the patient/parent report any history of CPS/APS/police involvement related to suspected abuse/neglect or domestic violence? no  Based on the information provided during the current assessment, is a mandated report of suspected abuse/neglect being made?  No    SUBSTANCE USE SCREENING  Pt denies substance use    ETOH negative  UDS negative      MENTAL STATUS   Participation: Active verbal participation and Open to feedback  Grooming: Casual and Neat  Orientation: Alert, Disoriented to: some recent events and Evidence of delusions present  Behavior: Calm  Eye contact: Limited  Mood: Anxious  Affect: Constricted, Blunted, Flat and Anxious  Thought process: Logical, Goal-directed, Circumstantial and Perseveration  Thought content: Within normal limits, Preoccupation, Rumination and Paranoia  Speech: Rate within normal limits and Volume within normal limits  Perception: Within  normal limits and Evidence of hallucination  Memory:  Recent:  Limited  Insight: Adequate  Judgment:  Adequate  Other:    Collateral information:   Source:  [] Significant other present in person:   [] Significant other by telephone  [] [facility]   [x] [facility] Nursing Staff  [x] [facility]  [] Other:     [] Unable to complete full assessment due to:  [] Acute intoxication  [] Patient declined to participate/engage  [] Patient verbally unresponsive  [] Significant cognitive deficits  [] Significant perceptual distortions or behavioral disorganization  [] Other:      CLINICAL IMPRESSIONS:  Primary:  Schizophrenia by history  Secondary:  homeless       IDENTIFIED NEEDS/PLAN:  [Trigger DISPOSITION list for any items marked]    []  Imminent safety risk - self [] Imminent safety risk - others   []  Acute substance withdrawal []  Psychosis/Impaired reality testing   [x]  Mood/anxiety []  Substance use/Addictive behavior   [x]  Maladaptive behaviro []  Parent/child conflict   []  Family/Couples conflict []  Biomedical   [x]  Housing [x]  Financial   []   Legal  Occupational/Educational   []  Domestic violence []  Other:     Recommended Plan of Care:  Refer to [place] [facility], [facility] ([facility]), [facility], homeless resoucres; [employer] and C insurance plans; writer RN reviewed community  resources with  pt, with written information given; writer RN to fax pt referral to [facility] fax #[phone]; pt verbalized understanding; pt DC to self to [place]. [facility]s homeless shelter with transport by [facility]    Does patient express agreement with the above plan? yes    Referral appointment(s) scheduled? no    Alert team only:   I have discussed findings and recommendations with Dr. MARGARETH Perea who is in agreement with these recommendations. Pt is not on a legal hold    Referral information sent to the following community  providers :  [facility] fax #[phone]    If applicable : Referred  to :IQRA Mcgill R.N.  3/5/2021

## 2021-03-10 ENCOUNTER — HOSPITAL ENCOUNTER (EMERGENCY)
Dept: HOSPITAL 8 - ED | Age: 21
Discharge: HOME | End: 2021-03-10
Payer: COMMERCIAL

## 2021-03-10 VITALS — SYSTOLIC BLOOD PRESSURE: 138 MMHG | DIASTOLIC BLOOD PRESSURE: 78 MMHG

## 2021-03-10 VITALS — WEIGHT: 178.35 LBS | HEIGHT: 72 IN | BODY MASS INDEX: 24.16 KG/M2

## 2021-03-10 DIAGNOSIS — F20.9: Primary | ICD-10-CM

## 2021-03-10 DIAGNOSIS — R19.7: ICD-10-CM

## 2021-03-10 DIAGNOSIS — R07.89: ICD-10-CM

## 2021-03-10 DIAGNOSIS — R10.9: ICD-10-CM

## 2021-03-10 DIAGNOSIS — R11.0: ICD-10-CM

## 2021-03-10 DIAGNOSIS — Z79.899: ICD-10-CM

## 2021-03-10 LAB
ALBUMIN SERPL-MCNC: 4.1 G/DL (ref 3.4–5)
ANION GAP SERPL CALC-SCNC: 5 MMOL/L (ref 5–15)
BASOPHILS # BLD AUTO: 0.1 X10^3/UL (ref 0–0.1)
BASOPHILS NFR BLD AUTO: 1 % (ref 0–1)
CALCIUM SERPL-MCNC: 9 MG/DL (ref 8.5–10.1)
CHLORIDE SERPL-SCNC: 105 MMOL/L (ref 98–107)
CREAT SERPL-MCNC: 0.84 MG/DL (ref 0.7–1.3)
EOSINOPHIL # BLD AUTO: 0.1 X10^3/UL (ref 0–0.4)
EOSINOPHIL NFR BLD AUTO: 1 % (ref 1–7)
ERYTHROCYTE [DISTWIDTH] IN BLOOD BY AUTOMATED COUNT: 13.7 % (ref 9.4–14.8)
LYMPHOCYTES # BLD AUTO: 2.4 X10^3/UL (ref 1–3.4)
LYMPHOCYTES NFR BLD AUTO: 26 % (ref 22–44)
MCH RBC QN AUTO: 29.9 PG (ref 27.5–34.5)
MCHC RBC AUTO-ENTMCNC: 34.2 G/DL (ref 33.2–36.2)
MD: NO
MONOCYTES # BLD AUTO: 0.6 X10^3/UL (ref 0.2–0.8)
MONOCYTES NFR BLD AUTO: 6 % (ref 2–9)
NEUTROPHILS # BLD AUTO: 6.2 X10^3/UL (ref 1.8–6.8)
NEUTROPHILS NFR BLD AUTO: 67 % (ref 42–75)
PLATELET # BLD AUTO: 279 X10^3/UL (ref 130–400)
PMV BLD AUTO: 7.8 FL (ref 7.4–10.4)
RBC # BLD AUTO: 4.9 X10^6/UL (ref 4.38–5.82)
VANCOMYCIN TROUGH SERPL-MCNC: < 1.7 MG/DL (ref 2.8–20)

## 2021-03-10 PROCEDURE — 80048 BASIC METABOLIC PNL TOTAL CA: CPT

## 2021-03-10 PROCEDURE — G0480 DRUG TEST DEF 1-7 CLASSES: HCPCS

## 2021-03-10 PROCEDURE — 80299 QUANTITATIVE ASSAY DRUG: CPT

## 2021-03-10 PROCEDURE — 82040 ASSAY OF SERUM ALBUMIN: CPT

## 2021-03-10 PROCEDURE — 85025 COMPLETE CBC W/AUTO DIFF WBC: CPT

## 2021-03-10 PROCEDURE — 80329 ANALGESICS NON-OPIOID 1 OR 2: CPT

## 2021-03-10 PROCEDURE — 80307 DRUG TEST PRSMV CHEM ANLYZR: CPT

## 2021-03-10 PROCEDURE — 99283 EMERGENCY DEPT VISIT LOW MDM: CPT

## 2021-03-10 PROCEDURE — 36415 COLL VENOUS BLD VENIPUNCTURE: CPT

## 2021-03-10 NOTE — NUR
PT REC'VD DISCHARGE INSTRUCTIONS AND EDUCATION. PT HAD NO QUESTIONS. PT GIVEN 
TAXI VOUCHER. PT AMBULATED TO AL AREA, STEADY GAIT.

## 2021-03-11 ENCOUNTER — HOSPITAL ENCOUNTER (EMERGENCY)
Facility: MEDICAL CENTER | Age: 21
End: 2021-03-11
Attending: EMERGENCY MEDICINE
Payer: COMMERCIAL

## 2021-03-11 VITALS
RESPIRATION RATE: 18 BRPM | HEIGHT: 72 IN | SYSTOLIC BLOOD PRESSURE: 124 MMHG | TEMPERATURE: 98 F | DIASTOLIC BLOOD PRESSURE: 76 MMHG | WEIGHT: 199 LBS | OXYGEN SATURATION: 95 % | BODY MASS INDEX: 26.95 KG/M2 | HEART RATE: 76 BPM

## 2021-03-11 DIAGNOSIS — F20.9 SCHIZOPHRENIA, UNSPECIFIED TYPE (HCC): ICD-10-CM

## 2021-03-11 DIAGNOSIS — F29 PSYCHOSIS, UNSPECIFIED PSYCHOSIS TYPE (HCC): ICD-10-CM

## 2021-03-11 DIAGNOSIS — R44.3 HALLUCINATIONS: ICD-10-CM

## 2021-03-11 LAB — POC BREATHALIZER: 0 PERCENT (ref 0–0.01)

## 2021-03-11 PROCEDURE — 90791 PSYCH DIAGNOSTIC EVALUATION: CPT

## 2021-03-11 PROCEDURE — 99284 EMERGENCY DEPT VISIT MOD MDM: CPT

## 2021-03-11 PROCEDURE — 700111 HCHG RX REV CODE 636 W/ 250 OVERRIDE (IP): Performed by: EMERGENCY MEDICINE

## 2021-03-11 PROCEDURE — A9270 NON-COVERED ITEM OR SERVICE: HCPCS | Performed by: EMERGENCY MEDICINE

## 2021-03-11 PROCEDURE — 96372 THER/PROPH/DIAG INJ SC/IM: CPT

## 2021-03-11 PROCEDURE — 700102 HCHG RX REV CODE 250 W/ 637 OVERRIDE(OP): Performed by: EMERGENCY MEDICINE

## 2021-03-11 PROCEDURE — 302970 POC BREATHALIZER: Performed by: EMERGENCY MEDICINE

## 2021-03-11 RX ORDER — ARIPIPRAZOLE 10 MG/1
10 TABLET ORAL DAILY
Status: DISCONTINUED | OUTPATIENT
Start: 2021-03-12 | End: 2021-03-12 | Stop reason: HOSPADM

## 2021-03-11 RX ORDER — HYDROXYZINE HYDROCHLORIDE 25 MG/ML
50 INJECTION, SOLUTION INTRAMUSCULAR ONCE
Status: COMPLETED | OUTPATIENT
Start: 2021-03-11 | End: 2021-03-11

## 2021-03-11 RX ADMIN — ARIPIPRAZOLE 10 MG: 10 TABLET ORAL at 20:34

## 2021-03-11 RX ADMIN — HYDROXYZINE HYDROCHLORIDE 50 MG: 25 INJECTION, SOLUTION INTRAMUSCULAR at 20:33

## 2021-03-11 ASSESSMENT — FIBROSIS 4 INDEX: FIB4 SCORE: 0.27

## 2021-03-11 NOTE — ED TRIAGE NOTES
Chief Complaint   Patient presents with   • Psych Eval     Pt BIB [facility] for above complaint. Pt reports running out of medication, unsure of name, maybe haldol. Hx schizophrenia, presents with pressured speech and visual hallucinations.denies SI/HI      /74   Pulse 88   Temp 36.7 °C (98 °F) (Temporal)   Resp 18   Ht 1.829 m (6')   Wt 90.3 kg (199 lb)   SpO2 92%   BMI 26.99 kg/m²

## 2021-03-11 NOTE — ED PROVIDER NOTES
ED Provider Note    Scribed for Gabriela Phoenix M.D. by Racheal Sullivan. 3/11/2021, 2:15 PM.    Primary care provider: Pcp Pt States None  Means of arrival: EMS  History obtained from: patient  History limited by: altered mental status    CHIEF COMPLAINT  Chief Complaint   Patient presents with   • Psych Eval       HPI  Boaz Reis is a 20 y.o. male with a history of schizophrenia who presents to the Emergency Department via EMS for a psychiatric evaluation. A restaurant called EMS because the patient was behaving oddly. He reports visual and auditory hallucinations. He denies any alcohol or drug use. He also denies any abdominal pain, suicidal ideation, or homicidal ideation. Per RN, patient has run out of his medications and is unsure what he was taking.     Further history of present illness cannot be obtained due to patient being a poor historian.    REVIEW OF SYSTEMS  Pertinent positives include visual and auditory hallucinations. Pertinent negatives include no abdominal pain, fevers, recent illness, homicidal or suicidal ideation.     Further ROS cannot be obtained due to the patient being a poor historian    PAST MEDICAL HISTORY   Schizophrenia.     SURGICAL HISTORY  patient denies any surgical history    SOCIAL HISTORY  Social History     Tobacco Use   • Smoking status: Current Some Day Smoker     Types: Cigarettes   • Smokeless tobacco: Never Used   Substance Use Topics   • Alcohol use: Not Currently   • Drug use: Not Currently        FAMILY HISTORY  Family History   Problem Relation Age of Onset   • Hypertension Father        CURRENT MEDICATIONS  Patient unsure of what medications he takes.     ALLERGIES  No Known Allergies    PHYSICAL EXAM  VITAL SIGNS: /74   Pulse 88   Temp 36.7 °C (98 °F) (Temporal)   Resp 18   Ht 1.829 m (6')   Wt 90.3 kg (199 lb)   SpO2 92%   BMI 26.99 kg/m²     Constitutional:  Alert in no apparent distress.  HENT: Normocephalic, Bilateral external ears normal. Nose  normal.   Eyes: Pupils are equal and reactive. Conjunctiva normal, non-icteric.   Thorax & Lungs: Easy unlabored respirations  Abdomen:  No gross signs of peritonitis, no pain with movement   Skin: Visualized skin is  Dry, No erythema, No rash.   Extremities:   No edema, No asymmetry  Neurologic: Alert, Grossly non-focal.   Psychiatric: Hallucinations, no HI or SI.     .  DIAGNOSTIC STUDIES / PROCEDURES\    LABS  Results for orders placed or performed during the hospital encounter of 03/11/21   POC BREATHALIZER   Result Value Ref Range    POC Breathalizer 0.00 0.00 - 0.01 Percent      All labs reviewed by me.      COURSE & MEDICAL DECISION MAKING  Nursing notes, VS, PMSFHx reviewed in chart.     2:15 PM Patient seen and examined at bedside.  Patient presents to the emergency department with complaints of hallucinations.  Review of the chart shows he has a history of schizophrenia.  He also has a history of poor medication compliance.  Sounds like he has been off his medications.  He is unsure exactly what medications he is normally on.  Review of the chart does not show history of meth or drug use.  Denies alcohol use.  Is not a great historian and therefore it is difficult to get much further history from him.  I am concerned he is not able to care for himself due to being off his meds.    Ordered for urine drug screen and POC breathalizer to evaluate.  Also consult the alert team to further evaluate him.    5:08 PM patient has been resting.  Still awaiting urine drug screen.  Have placed a consult for the alert team to evaluate him now that the alcohol level is negative.    6:00PM care turned over to Dr. Borges for final disposition once evaluated by alert team.    FINAL IMPRESSION  1. Hallucinations    2. Schizophrenia, unspecified type (HCC)          Racheal DUARTE (Fco), am scribing for, and in the presence of, Gabriela Phoenix M.D..    Electronically signed by: Racheal Hill), 3/11/2021    Gabriela DUARTE  DARÍO Phoenix M.D. personally performed the services described in this documentation, as scribed by Racheal Sullivan in my presence, and it is both accurate and complete. C    The note accurately reflects work and decisions made by me.  Gabriela Phoenix M.D.  3/11/2021  5:54 PM

## 2021-03-12 NOTE — DISCHARGE PLANNING
Medical Social Work     SW received a call from [facility] with [facility] and they are accepting the pt. The accepting MD is Dr. Houston.     MSW called [facility] and obtained a authorizations from [facility].     MSW faxed a PCS form to [facility] and set up transport with [facility] for 2015. Transport packet complete and given the RN.

## 2021-03-12 NOTE — CONSULTS
"[facility]   TRIAGE ASSESSMENT    Name: Boaz Reis  MRN: [id number]  : 2000  Age: 20 y.o.  Date of assessment: 3/11/2021  PCP: Pcp Pt States None  Persons in attendance: Patient    CHIEF COMPLAINT/PRESENTING ISSUE (as stated by patient): 20 year old male BIB EMS today d/t disorganized thoughts and behaviors; legal hold, inability to care for self; pt with recent [facility] ED visit 3/5/2021 d/t similar presentation, disorganized, does not have RX psych meds, homeless, and DC'd to self to f/u with outpt MH providers in the AM which pt did not f/u with; today, pt very malodorous; alert, oriented to person, place, date, some recent memory recall impairment; anxious but cooperative; with disorganized thoughts and behaviors; appears internally preoccupied; some thought blocking noted; no delusions noted; with generalized paranoia and states A/H and V/H hallucinations present; insight, judgment impaired;  at this time, pt is not able to attend to ADL's or identify how to meet basic needs; currently homeless, staying on the streets, no money for food and last meal yesterday \"a stranger bought me food\"; denies SI, HI, or self-harm ideation; states he wants help with his medication, and housing, that his father won't let him live with him and has been walking without resources; states he has not f/u with outpt MH providers; states he thinks his current psych med includes Abilify 10 mg PO daily, states last dose taken at [facility] ED 3/6/2021; with h/o inpt MH tx including [facility] 2/15/2021, 2021, 2020, 2020, 10/2019, [facility] 2021, 2020; denies substance use; unemployed; canot remember his father's phone number      Chief Complaint   Patient presents with   • Psych Eval   • Medication Refill        CURRENT LIVING SITUATION/SOCIAL SUPPORT: homeless    BEHAVIORAL HEALTH TREATMENT HISTORY  Does patient/parent report a history of prior behavioral health treatment for patient? "   Dates Level of Care Facilty/Provider Diagnosis/Problem Medications   2/23/2021, 7/2020, 6/2020 inpt [facility] Schizophrenia Abilify 10 mg PO daily    2/15/2021, 1/2021,6/2020, 10/2019 inpt MH [facility]             SAFETY ASSESSMENT - SELF  Does patient acknowledge current or past symptoms of dangerousness to self? no  Does parent/significant other report patient has current or past symptoms of dangerousness to self? N\A  Does presenting problem suggest symptoms of dangerousness to self? No    SAFETY ASSESSMENT - OTHERS  Does patient acknowledge current or past symptoms of aggressive behavior or risk to others? no  Does parent/significant other report patient has current or past symptoms of aggressive behavior or risk to others?  N\A  Does presenting problem suggest symptoms of dangerousness to others? No    Crisis Safety Plan completed and copy given to patient? no    ABUSE/NEGLECT SCREENING  Does patient report feeling “unsafe” in his/her home, or afraid of anyone?  no  Does patient report any history of physical, sexual, or emotional abuse?  no  Does parent or significant other report any of the above? N\A  Is there evidence of neglect by self?  no  Is there evidence of neglect by a caregiver? no  Does the patient/parent report any history of CPS/APS/police involvement related to suspected abuse/neglect or domestic violence? no  Based on the information provided during the current assessment, is a mandated report of suspected abuse/neglect being made?  No    SUBSTANCE USE SCREENING  Pt denies current substance use      MENTAL STATUS   Participation: Limited verbal participation and Open to feedback  Grooming: Disheveled and malodorous  Orientation: Alert, Confused, Disoriented to: some recent events and Evidence of hallucinations present  Behavior: Calm  Eye contact: Limited  Mood: Anxious  Affect: Constricted, Blunted, Flat and Anxious  Thought process: Circumstantial and  Perseveration  Thought content: Preoccupation, Rumination and Paranoia  Speech: Rate within normal limits and Volume within normal limits  Perception: Evidence of hallucination  Memory:  Poor memory for chronology of events  Insight: Limited  Judgment:  Limited  Other:    Collateral information:   Source:  [] Significant other present in person:   [] Significant other by telephone  [] [facility]   [x] [facility]  [x] [facility]  [] Other:     [] Unable to complete full assessment due to:  [] Acute intoxication  [] Patient declined to participate/engage  [] Patient verbally unresponsive  [] Significant cognitive deficits  [] Significant perceptual distortions or behavioral disorganization  [] Other:      CLINICAL IMPRESSIONS:  Primary:  Schizophrenia by history  Secondary:  housing       IDENTIFIED NEEDS/PLAN:  [Trigger DISPOSITION list for any items marked]    []  Imminent safety risk - self [] Imminent safety risk - others   []  Acute substance withdrawal [x]  Psychosis/Impaired reality testing   []  Mood/anxiety []  Substance use/Addictive behavior   []  Maladaptive behaviro []  Parent/child conflict   []  Family/Couples conflict []  Biomedical   [x]  Housing [x]  Financial   []   Legal  Occupational/Educational   []  Domestic violence []  Other:     Recommended Plan of Care:  Actively being addressed by [facility]; [facility]; [facility]; Continue pt level of observation per the [place] Suicide Severity Rating Scale (C-SSRS) assessment completed by [facility] ED RN every shift which is currently no risk    Does patient express agreement with the above plan? yes    Referral appointment(s) scheduled? no    Alert team only:   I have discussed findings and recommendations with Dr. Borges who is in agreement with these recommendations. [facility] completed    Referral information sent to the  following community providers : NA    If applicable : Referred  to : Penny 3/11/2021 for legal hold follow up at (time): 1845      Isha Mcgill R.N.  3/11/2021

## 2021-03-12 NOTE — ED NOTES
Report given to ERNESTO who is here to  pt to take to facility - [facility]. All belongings in possession of pt on discharge. Ambulatory out to ambulance with [facility] staff at side. Pt a/ox4. Stable

## 2021-03-12 NOTE — DISCHARGE PLANNING
Medical Social Work    Referral: Legal Hold    Intervention: Legal Hold Paperwork given to SW by [employer] RN Al[place]    Legal Hold Initiated: Date: 3/11/21 Time: 1820    Patient’s Insurance Listed on Face Sheet: [facility]    Referrals sent to: [facility], , and [facility]    This referral contains the following information:  1) Face sheet __x__  2) Page 1 and Page 2 of Legal Hold __x__  3) Alert Team Assessment/Psych Assessment _x___  4) Head to toe physical exam _x___  5) Urine Drug Screen __Needs to be collected__  6) Blood Alcohol __x__  7) Vital signs __x__  8) Pregnancy test when applicable _n/a__  9) Medications list __x__    Plan: Patient will transfer to mental health facility once acceptance is obtained

## 2021-03-12 NOTE — ED NOTES
Pt placed on legal hold for inability to care for self by alert team RN and ERP. Ok to stay in clothing per ERP. Pt not expressing SI/HI, calm and cooperative.

## 2021-03-12 NOTE — ED NOTES
Pt resting in [facility]. VSS. No needs at this time. Unable to provide urine sample at  This time

## 2021-03-12 NOTE — ED NOTES
Message sent to pharmacy about missing medication - omni not letting override. Awaiting delivery of medicaiton.

## 2021-03-16 ENCOUNTER — HOSPITAL ENCOUNTER (EMERGENCY)
Facility: MEDICAL CENTER | Age: 21
End: 2021-03-17
Attending: EMERGENCY MEDICINE
Payer: COMMERCIAL

## 2021-03-16 DIAGNOSIS — Z59.00 HOMELESSNESS: ICD-10-CM

## 2021-03-16 DIAGNOSIS — F20.9 SCHIZOPHRENIA, UNSPECIFIED TYPE (HCC): ICD-10-CM

## 2021-03-16 LAB
EKG IMPRESSION: NORMAL
POC BREATHALIZER: 0 PERCENT (ref 0–0.01)

## 2021-03-16 PROCEDURE — 302970 POC BREATHALIZER: Performed by: EMERGENCY MEDICINE

## 2021-03-16 PROCEDURE — 700102 HCHG RX REV CODE 250 W/ 637 OVERRIDE(OP): Performed by: EMERGENCY MEDICINE

## 2021-03-16 PROCEDURE — A9270 NON-COVERED ITEM OR SERVICE: HCPCS | Performed by: EMERGENCY MEDICINE

## 2021-03-16 PROCEDURE — 99285 EMERGENCY DEPT VISIT HI MDM: CPT

## 2021-03-16 PROCEDURE — 90791 PSYCH DIAGNOSTIC EVALUATION: CPT

## 2021-03-16 PROCEDURE — 93005 ELECTROCARDIOGRAM TRACING: CPT | Performed by: EMERGENCY MEDICINE

## 2021-03-16 RX ORDER — OLANZAPINE 5 MG/1
10 TABLET ORAL ONCE
Status: COMPLETED | OUTPATIENT
Start: 2021-03-16 | End: 2021-03-16

## 2021-03-16 RX ORDER — DIPHENHYDRAMINE HCL 25 MG
25 TABLET ORAL ONCE
Status: COMPLETED | OUTPATIENT
Start: 2021-03-16 | End: 2021-03-16

## 2021-03-16 RX ADMIN — DIPHENHYDRAMINE HYDROCHLORIDE 25 MG: 25 TABLET ORAL at 18:44

## 2021-03-16 RX ADMIN — OLANZAPINE 10 MG: 5 TABLET, FILM COATED ORAL at 18:44

## 2021-03-16 SDOH — ECONOMIC STABILITY - HOUSING INSECURITY: HOMELESSNESS UNSPECIFIED: Z59.00

## 2021-03-16 ASSESSMENT — LIFESTYLE VARIABLES: DO YOU DRINK ALCOHOL: NO

## 2021-03-16 ASSESSMENT — FIBROSIS 4 INDEX: FIB4 SCORE: 0.27

## 2021-03-17 VITALS
HEIGHT: 72 IN | RESPIRATION RATE: 16 BRPM | SYSTOLIC BLOOD PRESSURE: 109 MMHG | DIASTOLIC BLOOD PRESSURE: 65 MMHG | WEIGHT: 200 LBS | OXYGEN SATURATION: 98 % | HEART RATE: 79 BPM | TEMPERATURE: 98 F | BODY MASS INDEX: 27.09 KG/M2

## 2021-03-17 NOTE — ED NOTES
Report to [facility] team. One bag of personal belongings given to [facility] Patient transferred to [place]. Calm and cooperative on departure.

## 2021-03-17 NOTE — ED PROVIDER NOTES
"ED Provider Note      CHIEF COMPLAINT  Chief Complaint   Patient presents with   • Off Psych Meds   • Tremors   • Schizophrenia     auditory hallucinations \"mumbling\"       HPI  Boaz Reis is a 20-year-old male brought in by bystanders/EMS personnel after he was found on the ground and talking to himself.  He has a history of schizophrenia, is actively staring off and appears to be responding to auditory hallucinations, he endorses some visual hallucinations, unknown time duration patient is a poor historian.  Fasting blood sugar by EMS personnel was 80, unknown past medical history though chart review shows no evidence of metabolic process.  Currently denies SI/HI those unclear as to the patient's ability to comprehend total sensorium.    PPE Note: I personally donned full PPE for all patient encounters during this visit, including being clean-shaven with an N95 respirator mask, gloves, and goggles.     Review shows that the patient was seen on both 3/5 and 3/11 for issues related to auditory and visual hallucinations and psychiatric illness.  It appears that he has been at the [facility] in the past for a long inpatient stay, he has financial restrictions regarding getting his prescriptions and has been living on the streets.    REVIEW OF SYSTEMS  Unable to obtain due to patient's psychosis, poor historian    PAST MEDICAL HISTORY  Past Medical History:   Diagnosis Date   • Schizophrenia (HCC)        FAMILY HISTORY  Family History   Problem Relation Age of Onset   • Hypertension Father        SOCIAL HISTORY  Social History     Tobacco Use   • Smoking status: Current Some Day Smoker     Types: Cigarettes   • Smokeless tobacco: Never Used   Substance Use Topics   • Alcohol use: Not Currently   • Drug use: Not Currently       SURGICAL HISTORY  History reviewed. No pertinent surgical history.    CURRENT MEDICATIONS  Home Medications     Reviewed by Madeline Franco R.N. (Registered Nurse) on 03/16/21 at " 1747  Med List Status: Unable to Obtain   Medication Last Dose Status        Patient Sumeet Taking any Medications                     ALLERGIES  No Known Allergies    PHYSICAL EXAM  VITAL SIGNS: /86   Pulse 99   Temp 36.5 °C (97.7 °F) (Temporal)   Resp 16   Ht 1.829 m (6')   Wt 90.7 kg (200 lb)   SpO2 95%   BMI 27.12 kg/m²   Constitutional: Awake and alert to self and location. Generally nontoxic  HENT: Normocephalic, Atraumatic, Bilateral external ears normal, Oropharynx moist, No oral exudates, Nose normal.   Eyes: PERRL, Conjunctiva normal, No discharge.   Neck: Normal range of motion, No tenderness, Supple, No stridor.   Cardiovascular: Normal heart rate (90), Normal rhythm  Thorax & Lungs: Normal breath sounds, No respiratory distress, No wheezing, No chest tenderness.  Abdomen: Bowel sounds normal, Soft, No tenderness, No masses, No pulsatile masses.    Skin:  No pathologic rash.   Extremities: No clubbing, cyanosis, or edema there  Neuro/Psychiatric:  Orientation: person, place only  Appearance: disheveled  Behavior:  + psychomotor aggitation   Speech: pressured   Mood: blunted   Affect: mood congruent   Thought Process: disorganized   Thought Content: no suicidal thoughts   Delusions: active auditory hallucinations, active visual hallucinations  Perceptions/Sensorium:   impaired  Cognition: poor attention   Language:   blunted  Insight and judgement: poor based on recent behaviors     COURSE & MEDICAL DECISION MAKING    Results for orders placed or performed during the hospital encounter of 20   EKG   Result Value Ref Range    Report       [facility] Emergency Dept.    Test Date:  2021  Pt Name:    KEVIN RIZO                 Department: ER  MRN:        [id number]                      Room:       [facility]  Gender:     Male                         Technician: [id number]  :        2000                   Requested By:MANNIE MIKE  Order #:    [id number]                     Reading MD: Anthony Orlando MD    Measurements  Intervals                                Axis  Rate:       71                           P:  NE:                                      QRS:        78  QRSD:       88                           T:          57  QT:         376  QTc:        409    Interpretive Statements  Sinus rate 71  diffuse ST ELEV, PROBABLE NORMAL EARLY REPOL PATTERN, no recip changes  Compared to ECG 07/08/2020 20:01:45  ST (T wave) deviation still present  Electronically Signed On 3- 21:54:34 PDT by Anthony Orlando MD         Labs Reviewed   POC BREATHALIZER - Normal   URINE DRUG SCREEN     MDM:  Patient presents emergency room for symptoms as described above.  The patient is known to have underlying psychiatric disease, appears that he continues to get homeless, is not on medications at the time of my evaluation appears to be in the middle of a psychiatric crisis.  He has no febrile illness, no evidence of acute trauma, his vital signs show borderline tachycardia but no evidence of myoclonus, rigidity.  He is mumbling incoherently, responding to internal stimulation, and urine drug screen, alcohol level and administration of medications is started.    There is no detectable alcohol level, urine drug screen is pending.  ED behavioral health evaluator has seen the patient and agrees that patient will benefit from a mental hold inpatient management and the patient is currently pending psychiatric placement.    FINAL IMPRESSION  Visit Diagnoses     ICD-10-CM   1. Schizophrenia, unspecified type (HCC)  F20.9   2. Homelessness  Z59.0       Blood pressure reviewed and likely elevated secondary to medical condition. Advised monitoring and followup.     This dictation was created using voice recognition software. The accuracy of the dictation is limited to the abilities of the software. I expect there may be some errors of grammar and possibly content. The nursing notes were reviewed and certain aspects of  this information were incorporated into this note.      Electronically signed by: Darion Cruz M.D., 3/16/2021 6:18 PM

## 2021-03-17 NOTE — CONSULTS
"[facility]   TRIAGE ASSESSMENT    Name: Boaz Reis  MRN: [id number]  : 2000  Age: 20 y.o.  Date of assessment: 3/16/2021  PCP: Pcp Pt States None  Persons in attendance: Patient    CHIEF COMPLAINT/PRESENTING ISSUE (as stated by patient): Patient is a 20 y.o male BIB [facility] from the side of the road. Patient found on sidewalk [facility] called by passerby. Patient is well known to this ED. His last admission on 3/11/2021, he was transferred to [facility] on a legal hold. Patient would not make eye contact or any verbal response on 1st attempt to interview. This writer waited about 1 hour for second attempt. Patient now able to answer some questions, but with minimal and latency of response. Poor eye contact.  Patient appears calm, affect is blunted, with evidence of internal stimuli. Unable to complete a comprehensive consult, but patient confirms hearing voices. Patient questioned about SI, after a long pause, patient responded \"yes\". Patient meets criteria for legal hold for safety and acute psychosis. Patient will benefit from a  hospitalization.   Chief Complaint   Patient presents with   • Off Psych Meds   • Tremors   • Schizophrenia     auditory hallucinations \"mumbling\"        CURRENT LIVING SITUATION/SOCIAL SUPPORT: Homeless    BEHAVIORAL HEALTH TREATMENT HISTORY  Does patient/parent report a history of prior behavioral health treatment for patient?   Yes:    Dates Level of Care Facilty/Provider Diagnosis/Problem Medications   2021, 2020, 2020 inpt [facility] Schizophrenia Abilify 10 mg PO daily   3/11/21,  2/15/2021, 2021,2020, 10/2019 inpt [facility] Schizophrenia           SAFETY ASSESSMENT - SELF  Does patient acknowledge current or past symptoms of dangerousness to self? yes  Does parent/significant other report patient has current or past symptoms of dangerousness to self? N\A  Does presenting problem suggest symptoms of dangerousness to " self? Yes:     Past Current    Suicidal Thoughts: [x]  [x]    Suicidal Plans: []  []    Suicidal Intent: []  []    Suicide Attempts: []  []    Self-Injury []  []      For any boxes checked above, provide detail: past and current suicidal ideation    History of suicide by family member: no  History of suicide by friend/significant other: no  Recent change in frequency/specificity/intensity of suicidal thoughts or self-harm behavior? no  Current access to firearms, medications, or other identified means of suicide/self-harm? no  If yes, willing to restrict access to means of suicide/self-harm? no  Protective factors present:  Did not answer the question    SAFETY ASSESSMENT - OTHERS  Does patient acknowledge current or past symptoms of aggressive behavior or risk to others? no  Does parent/significant other report patient has current or past symptoms of aggressive behavior or risk to others?  N\A  Does presenting problem suggest symptoms of dangerousness to others? No    Crisis Safety Plan completed and copy given to patient? no    ABUSE/NEGLECT SCREENING  Does patient report feeling “unsafe” in his/her home, or afraid of anyone?  no  Does patient report any history of physical, sexual, or emotional abuse?  no  Does parent or significant other report any of the above? N\A  Is there evidence of neglect by self?  no  Is there evidence of neglect by a caregiver? no  Does the patient/parent report any history of CPS/APS/police involvement related to suspected abuse/neglect or domestic violence? no  Based on the information provided during the current assessment, is a mandated report of suspected abuse/neglect being made?  No    SUBSTANCE USE SCREENING  Yes:  Rajan all substances used in the past 30 days: Denies any use       Last Use Amount   []   Alcohol     []   Marijuana     []   Heroin     []   Prescription Opioids  (used without prescription, for    recreation, or in excess of prescribed amount)     []   Other  "Prescription  (used without prescription, for    recreation, or in excess of prescribed amount)     []   Cocaine      []   Methamphetamine     []   \"\" drugs (ectasy, MDMA)     []   Other substances        UDS results: Pending  Breathalyzer results: 0.0    What consequences does the patient associate with any of the above substance use and or addictive behaviors? None    Risk factors for detox (check all that apply):  []  Seizures   []  Diaphoretic (sweating)   []  Tremors   []  Hallucinations   []  Increased blood pressure   []  Decreased blood pressure   []  Other   []  None      [] Patient education on risk factors for detoxification and instructed to return to ER as needed.      MENTAL STATUS   Participation: Very limited verbal participation  Grooming: Casual  Orientation: Evidence of hallucinations present  Behavior: Hypoactive and Unusual behaviors noted  Eye contact: Poor  Mood: Euthymic  Affect: Blunted  Thought process: Logical  Thought content: Evidence of delusion  Speech: Hypotalkative and Latency of response  Perception: Evidence of auditory hallucination  Memory:  Poor memory for chronology of events  Insight: Poor  Judgment:  Poor  Other:    Collateral information:   Source:  [] Significant other present in person:   [] Significant other by telephone  [] [facility]   [x] [facility] Nursing Staff  [x] [facility]  [x] Other: ERPO    [] Unable to complete full assessment due to:  [] Acute intoxication  [] Patient declined to participate/engage  [x] Patient verbally unresponsive to most questions  [] Significant cognitive deficits  [] Significant perceptual distortions or behavioral disorganization  [] Other:       CLINICAL IMPRESSIONS:  Primary:  Schizophrenia  Secondary:  SI       IDENTIFIED NEEDS/PLAN:  [Trigger DISPOSITION list for any items marked]    []  Imminent safety risk - self [] Imminent safety risk - others   []  Acute substance withdrawal []  Psychosis/Impaired " reality testing   []  Mood/anxiety []  Substance use/Addictive behavior   []  Maladaptive behaviro []  Parent/child conflict   []  Family/Couples conflict []  Biomedical   []  Housing []  Financial   []   Legal  Occupational/Educational   []  Domestic violence []  Other:     Recommended Plan of Care:  Actively being addressed by Legal Hold, [facility] Emergency Department and 1:1 sitter    Does patient express agreement with the above plan? yes    Referral appointment(s) scheduled? no    Alert team only:   I have discussed findings and recommendations with Dr. Cruz who is in agreement with these recommendations.     Referral information sent to the following community providers :    If applicable : Referred  to : [facility] for legal hold follow up at (time): Pending medical clearance      Shravan Ward R.N.  3/16/2021

## 2021-03-17 NOTE — DISCHARGE PLANNING
Medical Social Work    Referral: Legal Hold    Intervention: Legal Hold Paperwork given to SW by [facility] RN: Shravan    Legal Hold Initiated: Date: 03/16/2021  Time: 1818    Legal Hold faxed: Date: 03/17/2021  Time: 0127    Patient’s Insurance Listed on Face Sheet: [facility] and [place] Medicaid    Referrals sent to: [facility] and [facility]    Plan: Patient will transfer to mental health facility once acceptance is obtained.

## 2021-03-17 NOTE — ED NOTES
Patient sleeping at this time. Chest rise visible, nad observed. 1:1 sitter at door for continuous observation.

## 2021-03-17 NOTE — DISCHARGE PLANNING
Medical Social Work    Referral: Legal hold Transfer to Mental Health Facility    Intervention: LIZ received call from Ibeth at [facility] stating that Dr. Jones has accepted the patient for admission.  Ibeth requested that transport be arranged for 0330.    LIZ arranged for transportation to be set up through Priya with [facility].    Pt has transport benefit through [facility]; arranged with [facility].     The pt will be picked up at 0330.     LIZ notified the RN of the departure time as well as accepting facility.     LIZ created transfer packet and placed on chart. Original Legal Hold placed in packet.     Plan: Pt will transfer to [facility] at 0330.

## 2021-03-17 NOTE — ED TRIAGE NOTES
".  Chief Complaint   Patient presents with   • Off Psych Meds   • Tremors   • Schizophrenia     auditory hallucinations \"mumbling\"     BIBA from side of road passer saw patient on sidewalk shivering and put a coat on him called 911/. Pt has tremors on arrival. A&O x 2 slow to respond. Hx schizophrenia not taking medications. Pt admits to having auditory hallucinations.  Currently denies SI/HI  FSBS 80   "

## 2021-03-17 NOTE — DISCHARGE PLANNING
Medical Social Work    MSW receieved a copy of pt's legal hold from Alert Team.  Unable to send mental health referrals at this time pending H&P and UDS results.

## 2021-04-03 ENCOUNTER — HOSPITAL ENCOUNTER (EMERGENCY)
Facility: MEDICAL CENTER | Age: 21
End: 2021-04-03
Attending: EMERGENCY MEDICINE
Payer: COMMERCIAL

## 2021-04-03 VITALS
DIASTOLIC BLOOD PRESSURE: 89 MMHG | SYSTOLIC BLOOD PRESSURE: 156 MMHG | HEIGHT: 72 IN | HEART RATE: 81 BPM | WEIGHT: 199.96 LBS | RESPIRATION RATE: 15 BRPM | OXYGEN SATURATION: 97 % | TEMPERATURE: 97.9 F | BODY MASS INDEX: 27.08 KG/M2

## 2021-04-03 DIAGNOSIS — R44.3 HALLUCINATIONS: ICD-10-CM

## 2021-04-03 DIAGNOSIS — Z91.199 HISTORY OF NONCOMPLIANCE WITH MEDICAL TREATMENT: ICD-10-CM

## 2021-04-03 DIAGNOSIS — F20.89 OTHER SCHIZOPHRENIA (HCC): ICD-10-CM

## 2021-04-03 LAB — POC BREATHALIZER: 0 PERCENT (ref 0–0.01)

## 2021-04-03 PROCEDURE — 302970 POC BREATHALIZER: Performed by: EMERGENCY MEDICINE

## 2021-04-03 PROCEDURE — 700102 HCHG RX REV CODE 250 W/ 637 OVERRIDE(OP): Performed by: EMERGENCY MEDICINE

## 2021-04-03 PROCEDURE — 99283 EMERGENCY DEPT VISIT LOW MDM: CPT

## 2021-04-03 PROCEDURE — A9270 NON-COVERED ITEM OR SERVICE: HCPCS | Performed by: EMERGENCY MEDICINE

## 2021-04-03 RX ORDER — HALOPERIDOL 5 MG/1
5 TABLET ORAL ONCE
Status: DISCONTINUED | OUTPATIENT
Start: 2021-04-03 | End: 2021-04-03 | Stop reason: HOSPADM

## 2021-04-03 RX ORDER — ARIPIPRAZOLE 10 MG/1
10 TABLET ORAL ONCE
Status: COMPLETED | OUTPATIENT
Start: 2021-04-03 | End: 2021-04-03

## 2021-04-03 RX ORDER — ARIPIPRAZOLE 10 MG/1
10 TABLET ORAL DAILY
Status: DISCONTINUED | OUTPATIENT
Start: 2021-04-04 | End: 2021-04-03

## 2021-04-03 RX ORDER — ACETAMINOPHEN 500 MG
1000 TABLET ORAL
Status: SHIPPED | COMMUNITY
End: 2022-01-28

## 2021-04-03 RX ADMIN — ARIPIPRAZOLE 10 MG: 10 TABLET ORAL at 19:06

## 2021-04-03 ASSESSMENT — FIBROSIS 4 INDEX: FIB4 SCORE: 0.27

## 2021-04-04 NOTE — ED PROVIDER NOTES
ED Provider Note    CHIEF COMPLAINT  Chief Complaint   Patient presents with   • Hallucinations     reports seeing things and hearing things. walked into a [employer] and said that he thought he was going to die, told EMS that he has not taken his Haldol in a while, EMS gave him 5 PIV       HPI  Boaz Reis is a 20 y.o. male who presents to the emergency department for decompensated schizophrenia.  The patient has been here voices.  He apparently walked into the store and told him he was going to die.  EMS was called and patient was given some Haldol.    On arrival here the patient has no other complaints..  Denies any illness.  No fevers or chills.  Somewhat sedated.  Denies any alcohol or drugs.  Does seem to auditory hallucinations.  States he has a history of schizophrenia.  History is somewhat limited because of his sedation and schizophrenia.    REVIEW OF SYSTEMS  See HPI for further details.  Unable to obtain complete review of systems secondary to patient's schizophrenia, mental status and sedation from EMS.    PAST MEDICAL HISTORY  Past Medical History:   Diagnosis Date   • Schizophrenia (HCC)        FAMILY HISTORY  Family History   Problem Relation Age of Onset   • Hypertension Father        SOCIAL HISTORY  Social History     Socioeconomic History   • Marital status: Single     Spouse name: Not on file   • Number of children: Not on file   • Years of education: Not on file   • Highest education level: Not on file   Occupational History   • Not on file   Tobacco Use   • Smoking status: Current Some Day Smoker     Types: Cigarettes   • Smokeless tobacco: Never Used   Substance and Sexual Activity   • Alcohol use: Not Currently   • Drug use: Not Currently   • Sexual activity: Never   Other Topics Concern   • Behavioral problems Not Asked   • Interpersonal relationships Not Asked   • Sad or not enjoying activities Not Asked   • Suicidal thoughts Not Asked   • Poor school performance Not Asked   • Reading  difficulties Not Asked   • Speech difficulties Not Asked   • Writing difficulties Not Asked   • Inadequate sleep Not Asked   • Excessive TV viewing Not Asked   • Excessive video game use Not Asked   • Inadequate exercise Not Asked   • Sports related Not Asked   • Poor diet Not Asked   • Family concerns for drug/alcohol abuse Not Asked   • Poor oral hygiene Not Asked   • Bike safety Not Asked   • Family concerns vehicle safety Not Asked   Social History Narrative   • Not on file     Social Determinants of Health     Financial Resource Strain:    • Difficulty of Paying Living Expenses:    Food Insecurity:    • Worried About Running Out of Food in the Last Year:    • Ran Out of Food in the Last Year:    Transportation Needs:    • Lack of Transportation (Medical):    • Lack of Transportation (Non-Medical):    Physical Activity:    • Days of Exercise per Week:    • Minutes of Exercise per Session:    Stress:    • Feeling of Stress :    Social Connections:    • Frequency of Communication with Friends and Family:    • Frequency of Social Gatherings with Friends and Family:    • Attends Church Services:    • Active Member of Clubs or Organizations:    • Attends Club or Organization Meetings:    • Marital Status:    Intimate Partner Violence:    • Fear of Current or Ex-Partner:    • Emotionally Abused:    • Physically Abused:    • Sexually Abused:        SURGICAL HISTORY  History reviewed. No pertinent surgical history.    CURRENT MEDICATIONS  Home Medications     Reviewed by Tri Belcher (Pharmacy Tech) on 04/03/21 at 1837  Med List Status: Complete   Medication Last Dose Status   acetaminophen (TYLENOL) 500 MG Tab 4/2/2021 Active                ALLERGIES  No Known Allergies    PHYSICAL EXAM  VITAL SIGNS: /89   Pulse 81   Temp 36.6 °C (97.9 °F) (Temporal)   Resp 15   Ht 1.829 m (6')   Wt 90.7 kg (199 lb 15.3 oz)   SpO2 97%   BMI 27.12 kg/m²    Constitutional: Awake alert nontoxic.  HENT:  Normocephalic, Atraumatic, Bilateral external ears normal  Eyes: PERRL, EOMI, Conjunctiva normal, No discharge.   Neck: Normal range of motion  Cardiovascular: Normal heart rate, Normal rhythm, No murmurs  Thorax & Lungs: Normal breath sounds, No respiratory distress,  Abdomen: Bowel sounds normal, Soft, No tenderness  Skin: Warm, Dry, No erythema, No rash.   Musculoskeletal: Good range of motion in all major joints.  Neurologic: Alert, No focal deficits noted.   Psychiatric: Affect flattened.  Positive auditory hallucinations.  No suicidal ideation.        COURSE & MEDICAL DECISION MAKING  Pertinent Labs & Imaging studies reviewed. (See chart for details)    The patient presents emergency department for evaluation of schizophrenia and auditory hallucinations.  He has a history of schizophrenia.  Is not the best historian however chart is reviewed.  He does have a history of multiple visits for schizophrenia and multiple transfers.  The patient denies any acute medical problems or complaints.  Patient was somewhat limited secondary to sedation.  He will be seen by life skills.      The patient was seen evaluate by life skills.  He has been in our hospital recently and is well-known to [facility].  They have accepted him at [facility].  The patient be transferred [facility] for further work-up and treatment.        FINAL IMPRESSION  1. Other schizophrenia (HCC)     2. Hallucinations     3. History of noncompliance with medical treatment         2.   3.         Electronically signed by: El Garrett M.D., 4/3/2021 6:12 PM

## 2021-04-04 NOTE — ED TRIAGE NOTES
Chief Complaint   Patient presents with   • Hallucinations     reports seeing things and hearing things. walked into a [employer] and said that he thought he was going to die, told EMS that he has not taken his Haldol in a while, EMS gave him 5 PIV       Pt brought in by EMS for above. EMS placed PIV    /89   Pulse 81   Temp 36.6 °C (97.9 °F) (Temporal)   Resp 15   Ht 1.829 m (6')   Wt 90.7 kg (199 lb 15.3 oz)   SpO2 97%   BMI 27.12 kg/m²

## 2021-04-04 NOTE — DISCHARGE PLANNING
ALERT team  note:  20 year old male BIB EMS today for disorganized thoughts, not taking his medication; [place] and [facility] insurance plans; pt with noted h/o mot recent [facility] ED visit 3/16/2021, 3/11/51045/5/2021 d/t similar presentation, disorganized, does not have RX psych meds, homeless, with recent DC from inpt MH x 3 3/2021 to self to f/u with outpt MH providers which he does not do; today, alert, oriented to person, place, date, some recent memory recall impairment; anxious but cooperative;  appears internally preoccupied; able to follow directions; some thought blocking noted; no delusions noted; with generalized paranoia and states A/H and V/H hallucinations present; insight, judgment adequate; states recent DC from [facility] approx 1 week ago and current psych med includes Abilify 10 mg PO daily which pt states he has at his father's house where he resides; no SI, HI, or self-harm ideation noted; writer RN reviewed community  and homeless resources with pt, with written information given, including [facility], [facility] and [facility] ([facility]), [facility], and homeless resoucres; which writer FAY reviewed with pt during his [facility] ED visit 3/5/2021; also reviewed [facility] transportation included in his insurance plan; pt states he would like a voluntary MH intake/admission evaluation at [facility] tonight; writer RN assisted pt in calling [facility] and arranged pt transport to [facility] with [facility]; pt received Ability 10 mg PO [facility] at 1906; writer RN updated [facility] ERP Dr. Garrett; pt to DC to self tonight     Writer FAY faxed pt referral to [facility] care organization [facility] ([facility]) fax #[phone]

## 2021-04-04 NOTE — ED NOTES
Break RN: Patient discharged. Patient walked to the lobby w/ alert team to cab to take him to [facility]. IV removed

## 2021-04-04 NOTE — ED NOTES
Med rec complete per interview with pt at bedside. Pt states that he was taking Haldol but has not taken any for a couple of months. Pt denies taking any other prescription medications. Allergies reviewed. Pt denies abx use in the past 14 days

## 2022-01-27 ENCOUNTER — HOSPITAL ENCOUNTER (EMERGENCY)
Facility: MEDICAL CENTER | Age: 22
End: 2022-01-28
Attending: EMERGENCY MEDICINE
Payer: COMMERCIAL

## 2022-01-27 DIAGNOSIS — F20.9 SCHIZOPHRENIA, UNSPECIFIED TYPE (HCC): ICD-10-CM

## 2022-01-27 PROCEDURE — A9270 NON-COVERED ITEM OR SERVICE: HCPCS | Performed by: EMERGENCY MEDICINE

## 2022-01-27 PROCEDURE — 700102 HCHG RX REV CODE 250 W/ 637 OVERRIDE(OP): Performed by: EMERGENCY MEDICINE

## 2022-01-27 PROCEDURE — 90791 PSYCH DIAGNOSTIC EVALUATION: CPT

## 2022-01-27 PROCEDURE — 99285 EMERGENCY DEPT VISIT HI MDM: CPT

## 2022-01-27 PROCEDURE — 302970 POC BREATHALIZER: Performed by: EMERGENCY MEDICINE

## 2022-01-27 RX ORDER — LORAZEPAM 1 MG/1
1 TABLET ORAL ONCE
Status: COMPLETED | OUTPATIENT
Start: 2022-01-27 | End: 2022-01-27

## 2022-01-27 RX ORDER — HALOPERIDOL 5 MG/1
5 TABLET ORAL ONCE
Status: COMPLETED | OUTPATIENT
Start: 2022-01-27 | End: 2022-01-27

## 2022-01-27 RX ADMIN — LORAZEPAM 1 MG: 1 TABLET ORAL at 21:05

## 2022-01-27 RX ADMIN — HALOPERIDOL 5 MG: 5 TABLET ORAL at 21:05

## 2022-01-27 ASSESSMENT — PAIN DESCRIPTION - PAIN TYPE: TYPE: ACUTE PAIN

## 2022-01-28 VITALS
TEMPERATURE: 97.6 F | OXYGEN SATURATION: 97 % | SYSTOLIC BLOOD PRESSURE: 117 MMHG | DIASTOLIC BLOOD PRESSURE: 79 MMHG | RESPIRATION RATE: 16 BRPM | HEART RATE: 67 BPM

## 2022-01-28 LAB
AMPHET UR QL SCN: NEGATIVE
BARBITURATES UR QL SCN: NEGATIVE
BENZODIAZ UR QL SCN: NEGATIVE
BZE UR QL SCN: NEGATIVE
CANNABINOIDS UR QL SCN: NEGATIVE
METHADONE UR QL SCN: NEGATIVE
OPIATES UR QL SCN: NEGATIVE
OXYCODONE UR QL SCN: NEGATIVE
PCP UR QL SCN: NEGATIVE
POC BREATHALIZER: 0 PERCENT (ref 0–0.01)
PROPOXYPH UR QL SCN: NEGATIVE
SARS-COV-2 RNA RESP QL NAA+PROBE: NOTDETECTED
SPECIMEN SOURCE: NORMAL

## 2022-01-28 PROCEDURE — U0005 INFEC AGEN DETEC AMPLI PROBE: HCPCS

## 2022-01-28 PROCEDURE — U0003 INFECTIOUS AGENT DETECTION BY NUCLEIC ACID (DNA OR RNA); SEVERE ACUTE RESPIRATORY SYNDROME CORONAVIRUS 2 (SARS-COV-2) (CORONAVIRUS DISEASE [COVID-19]), AMPLIFIED PROBE TECHNIQUE, MAKING USE OF HIGH THROUGHPUT TECHNOLOGIES AS DESCRIBED BY CMS-2020-01-R: HCPCS

## 2022-01-28 PROCEDURE — 80307 DRUG TEST PRSMV CHEM ANLYZR: CPT

## 2022-01-28 NOTE — DISCHARGE SUMMARY
Patient:Boaz Reis  Patient : 2000  Patient MRN: [id number]  Patient PCP: Pcp Pt States None    Admit Date: 2022  Discharge Date and Time: 22 12:01 PM  Discharge Diagnosis: Acute psychosis  Discharge Attending: Taina Decker D.O.  Discharge Service: ED Observation    ED Course  Boaz is a 21 y.o. male who was evaluated at [facility] for acute psychosis. Care was signed out to me from nighttime ERP. Patient is here on a legal hold for acute psychosis, responding to external stimuli. It appears he has a history of schizophrenia. Patient seen at the bedside. He is pacing but redirectable. I am told by nursing staff, that he has been accepted to [facility] and will transfer there approximately 1 or 2 PM. No events under my care.    Discharge Exam:  /79   Pulse 67   Temp 36.4 °C (97.6 °F) (Temporal)   Resp 16   SpO2 97% .    Constitutional: Awake and alert. Nontoxic  HENT:  Grossly normal  Eyes: Grossly normal  Neck: Normal range of motion  Cardiovascular: Normal heart rate   Thorax & Lungs: No respiratory distress  Abdomen: Nontender  Skin:  No pathologic rash.   Extremities: Well perfused  Psychiatric: Affect normal    Labs  Results for orders placed or performed during the hospital encounter of 22   Urine Drug Screen   Result Value Ref Range    Amphetamines Urine Negative Negative    Barbiturates Negative Negative    Benzodiazepines Negative Negative    Cocaine Metabolite Negative Negative    Methadone Negative Negative    Opiates Negative Negative    Oxycodone Negative Negative    Phencyclidine -Pcp Negative Negative    Propoxyphene Negative Negative    Cannabinoid Metab Negative Negative   SARS-CoV-2 PCR (24 hour In-House): Collect NP swab in VTM    Specimen: Respirate   Result Value Ref Range    SARS-CoV-2 Source NP Swab    POC Breathalizer   Result Value Ref Range    POC Breathalizer 0.00 0.00 - 0.01 Percent     Disposition: [facility]    Discharge Condition: Stable    Electronically signed by: Taina Decker D.O., 1/28/2022 12:01 PM

## 2022-01-28 NOTE — ED NOTES
"Pt appears to be sleeping in bed. VSS and pt is in no apparent distress. Pt is oriented to self, and place but is disoriented to time and situation and states that \"head trauma\" is why he is here. Pt is cooperative and is responding appropriately and  POC breathalyzer results are 0.00 at this time.   "

## 2022-01-28 NOTE — ED NOTES
Pt ambulated to the restroom with a steady gait and back to his room. Pt has no further needs at this time

## 2022-01-28 NOTE — DISCHARGE PLANNING
[facility] Fax Referral      [facility] ED Behavioral Health Alert Team:  [phone]    Referral: Legal Hold    Intervention: Patient referral to [facility] inpatient  facillity    Legal Hold Initiated: Date: 1/27/2022 Time: 1300    Patient’s Insurance Listed on Face Sheet: [facility] [employer]    Referrals sent to: [facility], [facility]    Referrals faxed by: Katia Avila RN    This referral contains the following information:  1) Face sheet __x__  2) Page 1 and Page 2 of Legal Hold __x__  3) Alert Team Assessment/Psych Assessment __x__  4) Head to toe physical exam __x__  5) Urine Drug Screen __pending__  6) Blood Alcohol _pending___  7) Vital signs _x___  8) Pregnancy test when applicable _N/A__  9) Medications list _x___  10) Covid screening _pending___    Plan: Patient will transfer to mental health facility once acceptance is obtained

## 2022-01-28 NOTE — ED NOTES
Attempted to breathalize pt, he was unable to successfully coordinate blowing to complete the test. After three attempts pt became visibly agitated. Instructed pt to use urinal when able so that staff can collect sample.

## 2022-01-28 NOTE — CONSULTS
[facility]   TRIAGE ASSESSMENT    Name: Boaz Reis  MRN: [id number]  : 2000  Age: 21 y.o.  Date of assessment: 2022  PCP: Pcp Pt States None  Persons in attendance: Patient  Patient Location: [facility]    CHIEF COMPLAINT/PRESENTING ISSUE (as stated by patient):  Pt is a 20 y/o male [facility] on legal hold after being incarcerated for an unclear reason. He was brought to the ED for psychosis. Pt disheveled, oriented x2-disoriented to time and situation, responding to internal stimuli, laughing inappropriately. Unable to answer behavioral health consult questions. Pt unable to care for himself due to acute psychosis. Hx of schizophrenia. Hx of multiple inpatient psychiatric hospitalizations. Unable to obtain breathalyzer after multiple attempts; pt came straight from nursing home. Findings discussed with ERP who agrees pt needs to transfer to an inpatient psychiatric facility for further evaluation and stabilization. [employer] and [employer] insurance plans. Inpatient psychiatric referrals sent to [facility] and [facility]. Outpatient psychiatric referral sent to [facility].    Chief Complaint   Patient presents with   • Psych Eval     Pt at [facility] when he was reportedly talking to himself, laughing inappropriately and generally appearing disheveled. Placed on L2K and brought here with law enforcement        CURRENT LIVING SITUATION/SOCIAL SUPPORT/FINANCIAL RESOURCES: Unable to assess due to acute psychosis.     BEHAVIORAL HEALTH/SUBSTANCE USE TREATMENT HISTORY  Does patient/parent report a history of prior behavioral health/substance use treatment for patient?   Yes:    Dates Level of Care Facilty/Provider Diagnosis/Problem Medications   2021, 2020, 2020 inpt [facility] Schizophrenia Abilify 10 mg PO daily   3/11/21,  2/15/2021, 2021,2020, 10/2019 inpt [facility] Schizophrenia               SAFETY ASSESSMENT - SELF  Does patient acknowledge current or past symptoms of dangerousness to self or is previous history noted? Unable to assess due to acute psychosis.   Does parent/significant other report patient has current or past symptoms of dangerousness to self? N\A  Does presenting problem suggest symptoms of dangerousness to self? Yes: due to acute psychosis.        SAFETY ASSESSMENT - OTHERS  Does patient acknowledge current or past symptoms of aggressive behavior or risk to others or is previous history noted? Unable to assess due to acute psychosis.   Does parent/significant other report patient has current or past symptoms of aggressive behavior or risk to others?  N\A  Does presenting problem suggest symptoms of dangerousness to others? Yes due to acute psychosis.    LEGAL HISTORY  Does patient acknowledge history of arrest/long-term/custodial or is previous history noted? yes    Crisis Safety Plan completed and copy given to patient? N\A    ABUSE/NEGLECT SCREENING  Does patient report feeling “unsafe” in his/her home, or afraid of anyone? Unable to assess due to acute psychosis.   Does patient report any history of physical, sexual, or emotional abuse?  Unable to assess due to acute psychosis.   Does parent or significant other report any of the above? N\A  Is there evidence of neglect by self?  yes  Is there evidence of neglect by a caregiver? N/A  Does the patient/parent report any history of CPS/APS/police involvement related to suspected abuse/neglect or domestic violence? no  Based on the information provided during the current assessment, is a mandated report of suspected abuse/neglect being made?  No    SUBSTANCE USE SCREENING    *Unable to assess due to acute psychosis.         UDS results: pending  Breathalyzer results: unable to obtain; pt unsuccessful after multiple attempts          MENTAL STATUS   Participation: Limited verbal participation  Grooming: Disheveled  Orientation:  Disoriented to: time and situation  Behavior: Unusual behaviors noted  Eye contact: Poor  Mood: Unable to assess due to acute psychosis.   Affect: Unable to assess due to acute psychosis.   Thought process: Unable to assess due to acute psychosis.   Thought content: Unable to assess due to acute psychosis.   Speech: Unable to assess due to acute psychosis.   Perception: Evidence of auditory hallucination, Evidence of hallucination and Derealization  Memory:  Poor memory for chronology of events  Insight: Poor  Judgment:  Poor  Other:    Collateral information:   Source:  [] Significant other present in person:   [] Significant other by telephone  [] [facility]   [x] [facility] Staff  [x] [facility]  [x] Other: ERP    [] Unable to complete full assessment due to:  [] Acute intoxication  [] Patient declined to participate/engage  [] Patient verbally unresponsive  [] Significant cognitive deficits  [x] Significant perceptual distortions or behavioral disorganization  [] Other:      CLINICAL IMPRESSIONS:  Primary:  Acute psychosis  Secondary:  Schizophrenia       IDENTIFIED NEEDS/PLAN:  [Trigger DISPOSITION list for any items marked]    [x]  Imminent safety risk - self [x] Imminent safety risk - others   []  Acute substance withdrawal [x]  Psychosis/Impaired reality testing   [x]  Mood/anxiety []  Substance use/Addictive behavior   [x]  Maladaptive behaviro []  Parent/child conflict   []  Family/Couples conflict []  Biomedical   [x]  Housing [x]  Financial   [x]   Legal  Occupational/Educational   []  Domestic violence []  Other:     Recommended Plan of Care:  Actively being addressed by Legal Hold and [facility] and Refer to inpatient psychiatric facilities. Pt disheveled, oriented x2-disoriented to time and situation, responding to internal stimuli, laughing inappropriately. Unable to answer behavioral health consult questions. Pt unable to care for himself due to acute  psychosis. Findings discussed with ERP who agrees pt needs to transfer to an inpatient psychiatric facility for further evaluation and stabilization. [employer] and [employer] insurance plans. Inpatient psychiatric referrals sent to [facility] and [facility]. Outpatient psychiatric referral sent to [facility].  Q15min checks recommended.  *Telesitter may not be utilized for moderate or high risk patients    Has the Recommended Plan of Care/Level of Observation been reviewed with the patient's assigned nurse? yes    Does patient/parent or guardian express agreement with the above plan? Unable to assess due to psychosis.     Referral appointment(s) scheduled? N\A    Alert team only:   I have discussed findings and recommendations with Dr. Givens who is in agreement with these recommendations.     Referral information sent to the following outpatient community providers : [facility]    Referral information sent to the following inpatient community providers : [facility], [facility]    If applicable : Referred  to  Alert Team for legal hold follow up at (time): 2130      Katia Avila R.N.  1/27/2022

## 2022-01-28 NOTE — ED TRIAGE NOTES
Chief Complaint   Patient presents with   • Psych Eval     Pt at [facility] when he was reportedly talking to himself, laughing inappropriately and generally appearing disheveled. Placed on L2K and brought here with law enforcement       Pt BIB law enforcement with the above complaint. Pt is alert and oriented only to self and location, but not to situation or time.     /76   Pulse 70   Temp 36.6 °C (97.9 °F) (Temporal)   Resp 16   SpO2 96%

## 2022-01-28 NOTE — ED PROVIDER NOTES
ED Provider Note    CHIEF COMPLAINT  Chief Complaint   Patient presents with   • Psych Eval     Pt at [facility] when he was reportedly talking to himself, laughing inappropriately and generally appearing disheveled. Placed on L2K and brought here with law enforcement       HPI  Boaz Reis is a 21 y.o. male who presents for psych eval.  The patient was incarcerated for an unclear reason as he cannot give me any history.  The [facility] brought the patient to the emergency department for psychosis.  The patient is disheveled and clearly speaking to somebody does not present.  He cannot hold a conversation is not answer questions appropriately.  Therefore no further history is obtained.    REVIEW OF SYSTEMS  See HPI for further details. All other systems are negative.     PAST MEDICAL HISTORY  Past Medical History:   Diagnosis Date   • Schizophrenia ([facility])        FAMILY HISTORY  [unfilled]    SOCIAL HISTORY  Social History     Socioeconomic History   • Marital status: Single     Spouse name: Not on file   • Number of children: Not on file   • Years of education: Not on file   • Highest education level: Not on file   Occupational History   • Not on file   Tobacco Use   • Smoking status: Current Some Day Smoker     Types: Cigarettes   • Smokeless tobacco: Never Used   Vaping Use   • Vaping Use: Never used   Substance and Sexual Activity   • Alcohol use: Not Currently     Comment: denies   • Drug use: Not Currently     Comment: denies   • Sexual activity: Never   Other Topics Concern   • Behavioral problems Not Asked   • Interpersonal relationships Not Asked   • Sad or not enjoying activities Not Asked   • Suicidal thoughts Not Asked   • Poor school performance Not Asked   • Reading difficulties Not Asked   • Speech difficulties Not Asked   • Writing difficulties Not Asked   • Inadequate sleep Not Asked   • Excessive TV viewing Not Asked   • Excessive video game use Not Asked   • Inadequate exercise Not Asked   •  Sports related Not Asked   • Poor diet Not Asked   • Family concerns for drug/alcohol abuse Not Asked   • Poor oral hygiene Not Asked   • Bike safety Not Asked   • Family concerns vehicle safety Not Asked   Social History Narrative   • Not on file     Social Determinants of Health     Financial Resource Strain:    • Difficulty of Paying Living Expenses: Not on file   Food Insecurity:    • Worried About Running Out of Food in the Last Year: Not on file   • Ran Out of Food in the Last Year: Not on file   Transportation Needs:    • Lack of Transportation (Medical): Not on file   • Lack of Transportation (Non-Medical): Not on file   Physical Activity:    • Days of Exercise per Week: Not on file   • Minutes of Exercise per Session: Not on file   Stress:    • Feeling of Stress : Not on file   Social Connections:    • Frequency of Communication with Friends and Family: Not on file   • Frequency of Social Gatherings with Friends and Family: Not on file   • Attends Caodaism Services: Not on file   • Active Member of Clubs or Organizations: Not on file   • Attends Club or Organization Meetings: Not on file   • Marital Status: Not on file   Intimate Partner Violence:    • Fear of Current or Ex-Partner: Not on file   • Emotionally Abused: Not on file   • Physically Abused: Not on file   • Sexually Abused: Not on file   Housing Stability:    • Unable to Pay for Housing in the Last Year: Not on file   • Number of Places Lived in the Last Year: Not on file   • Unstable Housing in the Last Year: Not on file       SURGICAL HISTORY  No past surgical history on file.    CURRENT MEDICATIONS  Home Medications    **Home medications have not yet been reviewed for this encounter**         ALLERGIES  No Known Allergies    PHYSICAL EXAM  VITAL SIGNS: /76   Pulse 70   Temp 36.6 °C (97.9 °F) (Temporal)   Resp 16   SpO2 96%       Constitutional: Unkempt  HENT: Normocephalic, Atraumatic, Bilateral external ears normal, Oropharynx  moist, No oral exudates, Nose normal.   Eyes: PERRLA, EOMI, Conjunctiva normal, No discharge.   Neck: Normal range of motion, No tenderness, Supple, No stridor.   Lymphatic: No lymphadenopathy noted.   Cardiovascular: Normal heart rate, Normal rhythm, No murmurs, No rubs, No gallops.   Thorax & Lungs: Normal breath sounds, No respiratory distress, No wheezing, No chest tenderness.   Abdomen: Bowel sounds normal, Soft, No tenderness, No masses, No pulsatile masses.   Skin: Warm, Dry, No erythema, No rash.   Back: No tenderness, No CVA tenderness.   Extremities: Intact distal pulses, No edema, No tenderness, No cyanosis, No clubbing.   Neurologic: Alert & oriented x 3, Normal motor function, Normal sensory function, No focal deficits noted.   Psychiatric: Delusional, cannot answer questions appropriately, patient appears to have an auditory hallucinations and speaking to somebody not present while is at the bedside    COURSE & MEDICAL DECISION MAKING  Pertinent Labs & Imaging studies reviewed. (See chart for details)  This a 21-year-old male who presents the emerge department with psychosis.  In reviewing his records it sounds like the patient is noncompliant with his Haldol and has had similar presentations in the past.  Clearly cannot care for himself at this time as he cannot carry on conversation.  The patient did receive Haldol and Ativan orally.  The was already placed on a legal hold per the .  I will medically clear the patient for psychiatric care.    FINAL IMPRESSION  1.  Psychosis  2.  Medical clearance for psychiatric care      Disposition  The patient will be transferred for further psychiatric care in stable condition         Electronically signed by: Ruy Givens M.D., 1/27/2022 8:47 PM

## 2022-01-28 NOTE — ED NOTES
Pt transferred to [facility] calm and cooperative with [facility]. Pt ambulated with steady gait to ambulance bay. VSS and pt in no apparent distress at this time. Report called to Yoko at [facility]

## 2022-01-28 NOTE — DISCHARGE PLANNING
Alert Team:    [facility] called stating their doctor is requesting a urine drug screen. Informed that a urine drug screen has already been ordered and will send results once it's completed. [facility] also states they do not have staff to meet pt's high acuity at this time and will re-staff with day shift later this morning.

## 2022-01-28 NOTE — DISCHARGE PLANNING
Legal Hold Transfer     Referral: Legal hold transfer to Mental Health Facility @ [facility]     Pt's accepting physcian is MD Rosemarie     Transport arranged through [facility]     The pt will be picked up at 1200/Noon     Transfer packet created with original legal hold and placed on chart.       [facility] - Alert Team has been notified.

## 2022-02-24 ENCOUNTER — HOSPITAL ENCOUNTER (EMERGENCY)
Facility: MEDICAL CENTER | Age: 22
End: 2022-02-24
Attending: EMERGENCY MEDICINE
Payer: COMMERCIAL

## 2022-02-24 VITALS
OXYGEN SATURATION: 96 % | WEIGHT: 180.34 LBS | DIASTOLIC BLOOD PRESSURE: 73 MMHG | BODY MASS INDEX: 24.43 KG/M2 | HEART RATE: 98 BPM | RESPIRATION RATE: 16 BRPM | HEIGHT: 72 IN | SYSTOLIC BLOOD PRESSURE: 132 MMHG | TEMPERATURE: 97.1 F

## 2022-02-24 DIAGNOSIS — R11.2 NAUSEA AND VOMITING, INTRACTABILITY OF VOMITING NOT SPECIFIED, UNSPECIFIED VOMITING TYPE: ICD-10-CM

## 2022-02-24 PROCEDURE — 99283 EMERGENCY DEPT VISIT LOW MDM: CPT

## 2022-02-24 RX ORDER — ONDANSETRON 4 MG/1
4 TABLET, ORALLY DISINTEGRATING ORAL EVERY 6 HOURS PRN
Qty: 10 TABLET | Refills: 0 | Status: SHIPPED | OUTPATIENT
Start: 2022-02-24 | End: 2022-03-01

## 2022-02-24 ASSESSMENT — LIFESTYLE VARIABLES: DO YOU DRINK ALCOHOL: NO

## 2022-02-24 ASSESSMENT — FIBROSIS 4 INDEX: FIB4 SCORE: 0.28

## 2022-02-25 NOTE — ED NOTES
Pt discharged and understanding of discharge information and medications at this time. Pt alert and oriented, NAD noted, VSS. Pt ambulated to lobby with all belongings.

## 2022-02-25 NOTE — ED PROVIDER NOTES
"ER Provider Note     Scribed for Shravan Sims M.D. by Soraida Younger. 2/24/2022, 10:30 PM.    Primary Care Provider: Pcp Pt States None  Means of Arrival: walk in   History obtained from: Patient  History limited by: None     CHIEF COMPLAINT  Chief Complaint   Patient presents with    N/V     X 30 min       HPI  Boaz Reis is a 21 y.o. male who presents to the Emergency Department with nausea onset a few hours ago. The patient reports the symptoms started when he was eating [employer]. He endorses vomiting the food, but denies any hematemesis, diarrhea, chest pain, or shortness of breath. He denies experiencing these symptoms before. He also endorses polyuria and fevers for the past month. He notes he drinks socially and vapes daily. He adds he takes a lot of ibuprofen, but denies taking any other drugs or medications. The patient adds that he has a painful infected ingrown toe nail on his left foot.     REVIEW OF SYSTEMS  See HPI for further details.     PAST MEDICAL HISTORY   has a past medical history of Schizophrenia ([facility]) and TBI (traumatic brain injury) ([facility]).    SURGICAL HISTORY  patient denies any surgical history    SOCIAL HISTORY  Social History     Tobacco Use    Smoking status: Current Some Day Smoker     Types: Cigarettes    Smokeless tobacco: Never Used    Tobacco comment: Unknown amount   Vaping Use    Vaping Use: Never used   Substance Use Topics    Alcohol use: Not Currently     Comment: \"A lot\"    Drug use: Not Currently     Comment: denies      Social History     Substance and Sexual Activity   Drug Use Not Currently    Comment: denies       FAMILY HISTORY  Family History   Problem Relation Age of Onset    Hypertension Father        CURRENT MEDICATIONS  Home Medications    **Home medications have not yet been reviewed for this encounter**         ALLERGIES  No Known Allergies    PHYSICAL EXAM  VITAL SIGNS: /95   Pulse (!) 105   Temp 36.1 °C (96.9 °F) (Temporal)   Resp 18   Ht " 1.829 m (6')   Wt 81.8 kg (180 lb 5.4 oz)   SpO2 96% Comment: Room air  BMI 24.46 kg/m²     Constitutional: Alert in no apparent distress.  HENT: Normocephalic, Atraumatic, Bilateral external ears normal. Nose normal.   Eyes: Pupils are equal and reactive. Conjunctiva normal, non-icteric.   Heart: Regular rate and rhythm, no murmurs.    Lungs: Clear to auscultation bilaterally.  Skin: Warm, Dry, No erythema, No rash.   Neurologic: Alert, Grossly non-focal.   Psychiatric: Affect normal, Judgment normal, Mood normal, Appears appropriate and not intoxicated.     COURSE & MEDICAL DECISION MAKING  Pertinent Labs & Imaging studies reviewed. (See chart for details)    This is a 21 y.o. male that presents with nausea and vomiting that has since resolved. At this time the patient is well appearing. He has a benign abdominal exam. He is not vomited since he has been here. At this time no further testing is needed. We will give him a prescription for Zofran and discharge them home with strict return precautions and follow-up..     10:30 PM - Patient seen and examined at bedside.     10:54 PM - Patient was reevaluated at bedside and feeling improved. Notified patient that he will be discharged with Zofran to treat nausea. Recommended patient use warm water to treat foot wound. ED return precautions discussed. Patient was given the opportunity to ask questions and verbalizes agreement to the plan of care.        The patient will return for new or worsening symptoms and is stable at the time of discharge.    The patient is referred to a primary physician for blood pressure management, diabetic screening, and for all other preventative health concerns.      DISPOSITION:  Patient will be discharged home in stable condition.    FOLLOW UP:  [facility]  [address] [facility] [address]  [phone]          OUTPATIENT MEDICATIONS:  New Prescriptions    ONDANSETRON (ZOFRAN ODT) 4 MG TABLET DISPERSIBLE     Take 1 Tablet by mouth every 6 hours as needed for Nausea for up to 5 days.         FINAL IMPRESSION  1. Nausea and vomiting, intractability of vomiting not specified, unspecified vomiting type          I, Soraida Younger (Scribe), am scribing for, and in the presence of, Shravan Sims M.D..    Electronically signed by: Soraida Younger (Scribe), 2/24/2022    I, Shravan Sims M.D. personally performed the services described in this documentation, as scribed by Soraida Younger in my presence, and it is both accurate and complete. E    The note accurately reflects work and decisions made by me.  Shravan Sims M.D.  2/25/2022  3:43 AM

## 2022-02-25 NOTE — ED TRIAGE NOTES
"  Chief Complaint   Patient presents with   • N/V     X 30 min     Pt BIB EMS from [facility] to triage for above complaint. Pt reports nausea w/o vomiting after eating \"a large amount of [employer]\" this evening. Deneis CP, SOB, or abd pain. Pt's behavior abnormal in triage, looking around room unfocused and requiring frequent prompting to answer questions. Pt breathing directly into emesis bag and instructed by RN not to due so, so he does not pass out accidentally. Pt stated \"I really like this bag a lot, what should I do with it?\"  Hx unspecified psychiatric disorder.     Pt is alert/oriented, following commands, and answering questions appropriately with prompting from RN. No acute distress noted in triage and respirations are even and unlabored.   Pt placed in lobby and educated on triage process. Pt encouraged to alert staff for any changes in condition.    ./95   Pulse (!) 105   Temp 36.1 °C (96.9 °F) (Temporal)   Resp 18   Ht 1.829 m (6')   Wt 81.8 kg (180 lb 5.4 oz)   SpO2 96% Comment: Room air  BMI 24.46 kg/m²       "

## 2022-02-25 NOTE — ED NOTES
Pt ambulated to green 36 with steady gait, pt alert and oriented at this time, placed in hospital gown and on monitor, call light in reach.

## 2022-03-08 ENCOUNTER — HOSPITAL ENCOUNTER (EMERGENCY)
Facility: MEDICAL CENTER | Age: 22
End: 2022-03-09
Attending: EMERGENCY MEDICINE
Payer: COMMERCIAL

## 2022-03-08 ENCOUNTER — APPOINTMENT (OUTPATIENT)
Dept: RADIOLOGY | Facility: MEDICAL CENTER | Age: 22
End: 2022-03-08
Attending: EMERGENCY MEDICINE
Payer: COMMERCIAL

## 2022-03-08 DIAGNOSIS — R41.0 CONFUSION: ICD-10-CM

## 2022-03-08 DIAGNOSIS — Z59.00 HOMELESSNESS: ICD-10-CM

## 2022-03-08 DIAGNOSIS — Z86.59 HISTORY OF SCHIZOPHRENIA: ICD-10-CM

## 2022-03-08 DIAGNOSIS — F20.9 SCHIZOPHRENIA, UNSPECIFIED TYPE (HCC): ICD-10-CM

## 2022-03-08 LAB
ALBUMIN SERPL BCP-MCNC: 4.7 G/DL (ref 3.2–4.9)
ALBUMIN/GLOB SERPL: 1.6 G/DL
ALP SERPL-CCNC: 88 U/L (ref 30–99)
ALT SERPL-CCNC: 22 U/L (ref 2–50)
AMPHET UR QL SCN: NEGATIVE
ANION GAP SERPL CALC-SCNC: 16 MMOL/L (ref 7–16)
APAP SERPL-MCNC: <5 UG/ML (ref 10–30)
APPEARANCE UR: CLEAR
AST SERPL-CCNC: 24 U/L (ref 12–45)
BARBITURATES UR QL SCN: NEGATIVE
BASOPHILS # BLD AUTO: 0.4 % (ref 0–1.8)
BASOPHILS # BLD: 0.06 K/UL (ref 0–0.12)
BENZODIAZ UR QL SCN: NEGATIVE
BILIRUB SERPL-MCNC: 0.2 MG/DL (ref 0.1–1.5)
BILIRUB UR QL STRIP.AUTO: NEGATIVE
BUN SERPL-MCNC: 14 MG/DL (ref 8–22)
BURR CELLS/RBC NFR CSF MANUAL: 0 %
BZE UR QL SCN: NEGATIVE
CALCIUM SERPL-MCNC: 9.2 MG/DL (ref 8.4–10.2)
CANNABINOIDS UR QL SCN: NEGATIVE
CHLORIDE SERPL-SCNC: 99 MMOL/L (ref 96–112)
CLARITY CSF: CLEAR
CO2 SERPL-SCNC: 23 MMOL/L (ref 20–33)
COLOR CSF: COLORLESS
COLOR SPUN CSF: COLORLESS
COLOR UR: YELLOW
CREAT SERPL-MCNC: 0.65 MG/DL (ref 0.5–1.4)
EKG IMPRESSION: NORMAL
EOSINOPHIL # BLD AUTO: 0 K/UL (ref 0–0.51)
EOSINOPHIL NFR BLD: 0 % (ref 0–6.9)
ERYTHROCYTE [DISTWIDTH] IN BLOOD BY AUTOMATED COUNT: 39.6 FL (ref 35.9–50)
ETHANOL BLD-MCNC: <10.1 MG/DL (ref 0–10)
GLOBULIN SER CALC-MCNC: 3 G/DL (ref 1.9–3.5)
GLUCOSE CSF-MCNC: 60 MG/DL (ref 40–80)
GLUCOSE SERPL-MCNC: 101 MG/DL (ref 65–99)
GLUCOSE UR STRIP.AUTO-MCNC: NEGATIVE MG/DL
GRAM STN SPEC: NORMAL
HCT VFR BLD AUTO: 48.6 % (ref 42–52)
HGB BLD-MCNC: 16.4 G/DL (ref 14–18)
IMM GRANULOCYTES # BLD AUTO: 0.08 K/UL (ref 0–0.11)
IMM GRANULOCYTES NFR BLD AUTO: 0.5 % (ref 0–0.9)
KETONES UR STRIP.AUTO-MCNC: NEGATIVE MG/DL
LACTATE BLD-SCNC: 1.9 MMOL/L (ref 0.5–2)
LEUKOCYTE ESTERASE UR QL STRIP.AUTO: NEGATIVE
LIPASE SERPL-CCNC: 29 U/L (ref 7–58)
LYMPHOCYTES # BLD AUTO: 2.04 K/UL (ref 1–4.8)
LYMPHOCYTES NFR BLD: 12.4 % (ref 22–41)
LYMPHOCYTES NFR CSF: 78 %
MCH RBC QN AUTO: 29.1 PG (ref 27–33)
MCHC RBC AUTO-ENTMCNC: 33.7 G/DL (ref 33.7–35.3)
MCV RBC AUTO: 86.3 FL (ref 81.4–97.8)
METHADONE UR QL SCN: NEGATIVE
MICRO URNS: NORMAL
MONOCYTES # BLD AUTO: 0.88 K/UL (ref 0–0.85)
MONOCYTES NFR BLD AUTO: 5.3 % (ref 0–13.4)
MONONUC CELLS NFR CSF: 22 %
NEUTROPHILS # BLD AUTO: 13.41 K/UL (ref 1.82–7.42)
NEUTROPHILS NFR BLD: 81.4 % (ref 44–72)
NITRITE UR QL STRIP.AUTO: NEGATIVE
NRBC # BLD AUTO: 0 K/UL
NRBC BLD-RTO: 0 /100 WBC
OPIATES UR QL SCN: NEGATIVE
OXYCODONE UR QL SCN: NEGATIVE
PCP UR QL SCN: NEGATIVE
PH UR STRIP.AUTO: 7.5 [PH] (ref 5–8)
PLATELET # BLD AUTO: 322 K/UL (ref 164–446)
PMV BLD AUTO: 9.6 FL (ref 9–12.9)
POTASSIUM SERPL-SCNC: 3.6 MMOL/L (ref 3.6–5.5)
PROPOXYPH UR QL SCN: NEGATIVE
PROT CSF-MCNC: 34 MG/DL (ref 15–45)
PROT SERPL-MCNC: 7.7 G/DL (ref 6–8.2)
PROT UR QL STRIP: NEGATIVE MG/DL
RBC # BLD AUTO: 5.63 M/UL (ref 4.7–6.1)
RBC # CSF: 0 CELLS/UL
RBC UR QL AUTO: NEGATIVE
S PYO DNA SPEC NAA+PROBE: NOT DETECTED
SALICYLATES SERPL-MCNC: <1 MG/DL (ref 15–25)
SIGNIFICANT IND 70042: NORMAL
SITE SITE: NORMAL
SODIUM SERPL-SCNC: 138 MMOL/L (ref 135–145)
SOURCE SOURCE: NORMAL
SP GR UR STRIP.AUTO: 1.01
SPECIMEN VOL CSF: 7 ML
TROPONIN T SERPL-MCNC: 7 NG/L (ref 6–19)
TSH SERPL DL<=0.005 MIU/L-ACNC: 2.61 UIU/ML (ref 0.38–5.33)
TUBE # CSF: 3
TUBE # CSF: 4
WBC # BLD AUTO: 16.5 K/UL (ref 4.8–10.8)
WBC # CSF: 0 CELLS/UL (ref 0–10)

## 2022-03-08 PROCEDURE — 62270 DX LMBR SPI PNXR: CPT

## 2022-03-08 PROCEDURE — 36415 COLL VENOUS BLD VENIPUNCTURE: CPT

## 2022-03-08 PROCEDURE — 99285 EMERGENCY DEPT VISIT HI MDM: CPT

## 2022-03-08 PROCEDURE — 82077 ASSAY SPEC XCP UR&BREATH IA: CPT

## 2022-03-08 PROCEDURE — 84157 ASSAY OF PROTEIN OTHER: CPT

## 2022-03-08 PROCEDURE — 94760 N-INVAS EAR/PLS OXIMETRY 1: CPT

## 2022-03-08 PROCEDURE — 80053 COMPREHEN METABOLIC PANEL: CPT

## 2022-03-08 PROCEDURE — 84443 ASSAY THYROID STIM HORMONE: CPT

## 2022-03-08 PROCEDURE — 87205 SMEAR GRAM STAIN: CPT

## 2022-03-08 PROCEDURE — 83605 ASSAY OF LACTIC ACID: CPT

## 2022-03-08 PROCEDURE — 89051 BODY FLUID CELL COUNT: CPT

## 2022-03-08 PROCEDURE — 83690 ASSAY OF LIPASE: CPT

## 2022-03-08 PROCEDURE — 71045 X-RAY EXAM CHEST 1 VIEW: CPT

## 2022-03-08 PROCEDURE — 85025 COMPLETE CBC W/AUTO DIFF WBC: CPT

## 2022-03-08 PROCEDURE — 80307 DRUG TEST PRSMV CHEM ANLYZR: CPT

## 2022-03-08 PROCEDURE — 93005 ELECTROCARDIOGRAM TRACING: CPT | Performed by: EMERGENCY MEDICINE

## 2022-03-08 PROCEDURE — 82945 GLUCOSE OTHER FLUID: CPT

## 2022-03-08 PROCEDURE — 87070 CULTURE OTHR SPECIMN AEROBIC: CPT

## 2022-03-08 PROCEDURE — 80143 DRUG ASSAY ACETAMINOPHEN: CPT

## 2022-03-08 PROCEDURE — 81003 URINALYSIS AUTO W/O SCOPE: CPT | Mod: XU

## 2022-03-08 PROCEDURE — 70450 CT HEAD/BRAIN W/O DYE: CPT

## 2022-03-08 PROCEDURE — 87651 STREP A DNA AMP PROBE: CPT

## 2022-03-08 PROCEDURE — 80179 DRUG ASSAY SALICYLATE: CPT

## 2022-03-08 PROCEDURE — 84484 ASSAY OF TROPONIN QUANT: CPT

## 2022-03-08 SDOH — ECONOMIC STABILITY - HOUSING INSECURITY: HOMELESSNESS UNSPECIFIED: Z59.00

## 2022-03-08 ASSESSMENT — FIBROSIS 4 INDEX: FIB4 SCORE: 0.35

## 2022-03-08 NOTE — ED NOTES
Pt covered in feces. Clothes bagged and at bedside. Pt given bed bath and tooth brush. Changed into clean gown and new linens placed on the gurney. Given warm blankets. Labs drawn and sent. Pt resting comfortably in [facility]

## 2022-03-08 NOTE — CONSULTS
"[facility]   TRIAGE ASSESSMENT    Name: Boaz Reis  MRN: [id number]  : 2000  Age: 21 y.o.  Date of assessment: 3/8/2022  PCP: Pcp Pt States None  Persons in attendance: Patient  Patient Location: [facility]    CHIEF COMPLAINT/PRESENTING ISSUE (as stated by patient): Pt is a 22 y/o male BIB EMS reporting that he has a rash and states he fell down some stairs a couple months ago. Pt oriented x1-disoriented to time, place, situation; internally preoccupied; not answering questions appropriately. Unable to answer most behavioral health consult questions. Denies SI/HI. Hx of schizophrenia. Hx of multiple inpatient psychiatric hospitalizations. ERIKA 0.00. UDS negative for all substances. Pt unable to care for himself due to acute psychosis. Findings discussed with ERP who agrees pt needs to transfer to an inpatient psychiatric facility for further evaluation and stabilization. [employer] and [employer] insurance plans. Inpatient psychiatric referrals sent to [facility] and [facility]. Outpatient psychiatric referral sent to [facility].  No risk on Navarro scale; Q15min checks recommended.  Chief Complaint   Patient presents with   • Rash     States he has a rash on the top of his feet for \"a while\". States he fell down some stairs a couple months ago when he noticed the rash     • Altered Mental Status     Disoriented to year        CURRENT LIVING SITUATION/SOCIAL SUPPORT/FINANCIAL RESOURCES: Pt states that he lives in the \"[facility] home.\"    BEHAVIORAL HEALTH/SUBSTANCE USE TREATMENT HISTORY  Does patient/parent report a history of prior behavioral health/substance use treatment for patient?   Yes:    Dates Level of Care Facilty/Provider Diagnosis/Problem Medications   [facility]   3/11/21,  2/15/2021, 2021,2020, 10/2019 in[facility] " Schizophrenia              SAFETY ASSESSMENT - SELF  Does patient acknowledge current or past symptoms of dangerousness to self or is previous history noted? Unable to assess due to psychosis.  Does parent/significant other report patient has current or past symptoms of dangerousness to self? N\A  Does presenting problem suggest symptoms of dangerousness to self? Yes due to acute psychosis    SAFETY ASSESSMENT - OTHERS  Does patient acknowledge current or past symptoms of aggressive behavior or risk to others or is previous history noted? Unable to assess due to psychosis.  Does parent/significant other report patient has current or past symptoms of aggressive behavior or risk to others?  N\A  Does presenting problem suggest symptoms of dangerousness to others? Yes due to acute psychosis.    LEGAL HISTORY  Does patient acknowledge history of arrest/care home/long-term or is previous history noted? yes    Crisis Safety Plan completed and copy given to patient? N\A    ABUSE/NEGLECT SCREENING  Does patient report feeling “unsafe” in his/her home, or afraid of anyone? Unable to assess due to psychosis.  Does patient report any history of physical, sexual, or emotional abuse? Unable to assess due to psychosis.  Does parent or significant other report any of the above? N\A  Is there evidence of neglect by self?  yes  Is there evidence of neglect by a caregiver? N/A  Does the patient/parent report any history of CPS/APS/police involvement related to suspected abuse/neglect or domestic violence? no  Based on the information provided during the current assessment, is a mandated report of suspected abuse/neglect being made?  No    SUBSTANCE USE SCREENING         UDS results: negative  Breathalyzer results: 0.00          MENTAL STATUS   Participation: Limited verbal participation  Grooming: Casual  Orientation: Alert and Disoriented to: time, place, situation  Behavior: Calm  Eye contact: Poor  Mood: Euthymic  Affect:  Constricted  Thought process: Circumstantial  Thought content: Preoccupation  Speech: Pressured, Hypotalkative and Latency of response  Perception: Derealization  Memory:  Poor memory for chronology of events  Insight: Poor  Judgment:  Poor  Other:    Collateral information:   Source:  [] Significant other present in person:   [] Significant other by telephone  [] [facility]   [x] [facility]  [x] [facility] Record  [x] Other: ERP    [] Unable to complete full assessment due to:  [] Acute intoxication  [] Patient declined to participate/engage  [] Patient verbally unresponsive  [] Significant cognitive deficits  [x] Significant perceptual distortions or behavioral disorganization  [] Other:      CLINICAL IMPRESSIONS:  Primary:  Psychosis  Secondary:  Schizophrenia       IDENTIFIED NEEDS/PLAN:  [Trigger DISPOSITION list for any items marked]    [x]  Imminent safety risk - self [x] Imminent safety risk - others   []  Acute substance withdrawal [x]  Psychosis/Impaired reality testing   [x]  Mood/anxiety []  Substance use/Addictive behavior   [x]  Maladaptive behaviro []  Parent/child conflict   []  Family/Couples conflict []  Biomedical   [x]  Housing [x]  Financial   [x]   Legal  Occupational/Educational   []  Domestic violence []  Other:     Recommended Plan of Care:  Actively being addressed by [facility] and Refer to inpatient psychiatric facilities. Pt oriented x1-disoriented to time, place, situation; internally preoccupied; not answering questions appropriately. Unable to answer most behavioral health consult questions. Pt unable to care for himself due to acute psychosis. Findings discussed with ERP who agrees pt needs to transfer to an inpatient psychiatric facility for further evaluation and stabilization. [employer] and [employer] insurance plans. Inpatient psychiatric referrals sent to [facility] and [facility]. Outpatient  psychiatric referral sent to [facility].  No risk on Sussex scale; Q15min checks recommended.  *Telesitter may not be utilized for moderate or high risk patients    Has the Recommended Plan of Care/Level of Observation been reviewed with the patient's assigned nurse? yes    Does patient/parent or guardian express agreement with the above plan? yes      Referral appointment(s) scheduled? N\A    Alert team only:   I have discussed findings and recommendations with Dr. Vásquez who is in agreement with these recommendations.     Referral information sent to the following outpatient community providers : [facility]    Referral information sent to the following inpatient community providers : [facility], [facility]    If applicable : Referred  to  Alert Team for legal hold follow up at (time): 0610      Katia Avila R.N.  3/8/2022

## 2022-03-08 NOTE — ED PROVIDER NOTES
"ED Provider Note    CHIEF COMPLAINT  Chief Complaint   Patient presents with   • Rash     States he has a rash on the top of his feet for \"a while\". States he fell down some stairs a couple months ago when he noticed the rash     • Altered Mental Status     Disoriented to year        [facility]    Primary care provider: Pcp Pt States None   History obtained from: Patient and EMS  History limited by: Patient with history of schizophrenia and TBI and not answering many questions    Boaz Reis is a 21 y.o. male who presents to the ED by EMS after he was found wandering in the street by [employer].  He had told the police that he has a rash on the top of his feet.  He was noted to be shivering and brought to the ED.  Patient states that he believes he has \"gangrene\" to the top of his left foot but unable to give any further details.  He states that he fell down some stairs \"several months\" ago.  He also reports that he has a nosebleed and chest pain.  He denies alcohol or drug use.  He denies fever.  He reports nausea and vomiting but no further details given.  He otherwise is not answering other questions and is very slow with following commands.    REVIEW OF SYSTEMS  Please see HPI for pertinent positives/negatives.  Limited due to patient's clinical condition.    PAST MEDICAL HISTORY  Past Medical History:   Diagnosis Date   • Schizophrenia (HCC)    • TBI (traumatic brain injury) ([facility])         SURGICAL HISTORY  No past surgical history on file.     SOCIAL HISTORY  Social History     Tobacco Use   • Smoking status: Current Some Day Smoker     Types: Cigarettes   • Smokeless tobacco: Never Used   • Tobacco comment: Unknown amount   Vaping Use   • Vaping Use: Never used   Substance and Sexual Activity   • Alcohol use: Not Currently     Comment: \"A lot\"   • Drug use: Not Currently     Comment: denies   • Sexual activity: Never        FAMILY HISTORY  Family History   Problem Relation Age of Onset   • Hypertension Father     "     CURRENT MEDICATIONS  Home Medications    **Home medications have not yet been reviewed for this encounter**          ALLERGIES  No Known Allergies     PHYSICAL EXAM  VITAL SIGNS: /85   Pulse 86   Temp 37.1 °C (98.8 °F) (Temporal)   Resp 18   SpO2 98%  @[email or web address][[id number]::@     Pulse ox interpretation: 98% I interpret this pulse ox as normal     Cardiac monitor interpretation: Sinus rhythm with heart rate in the 80s as interpreted by me.  The patient presented with reported chest pain and confusion and cardiac monitor was ordered to monitor for dysrhythmia.    Constitutional: Well developed, well nourished, alert and shivering/tremulous, poor hygiene and covered with feces from pelvis down, nontoxic appearance    HENT: No external signs of trauma, normocephalic, oropharynx moist and clear, nose normal    Eyes: PERRL, conjunctiva without erythema, no discharge, no icterus    Neck: Soft and supple, trachea midline, no stridor, no tenderness, no LAD, no JVD, patient moving his neck without apparent restrictions or discomfort  Cardiovascular: Regular rate and rhythm, no murmurs/rubs/gallops, strong distal pulses and good perfusion    Thorax & Lungs: No respiratory distress, CTAB   Abdomen: Soft, nontender, nondistended, no guarding, no rebound, normal BS    Back: No CVAT    Extremities: No cyanosis, no edema, no gross deformity, good ROM, no tenderness, intact distal pulses with brisk cap refill    Skin: Slightly cool, dry, no pallor/cyanosis, no rash noted    Lymphatic: No lymphadenopathy noted    Neuro: Alert and oriented to self and place but not time, GCS 15, moving all 4 extremities spontaneously  Psychiatric: Delayed response and not answering many questions      DIAGNOSTIC STUDIES / PROCEDURES    EKG  12 Lead EKG obtained at 0551 and interpreted by me:   Rate: 78   Rhythm: Sinus arrhythmia  Ectopy: None  Intervals: Normal   Axis: Borderline RAD  QRS: Normal   ST segments: Minimal diffuse J-point  elevation  T Waves: Normal    Clinical Impression: Sinus arrhythmia with minimal diffuse J-point elevation likely early repolarization or hyperthermia without other ischemic changes or dysrhythmia  Compared to March 16, 2021 without significant change      LABS  All labs reviewed by me.     Results for orders placed or performed during the hospital encounter of 03/08/22   CBC WITH DIFFERENTIAL   Result Value Ref Range    WBC 16.5 (H) 4.8 - 10.8 K/uL    RBC 5.63 4.70 - 6.10 M/uL    Hemoglobin 16.4 14.0 - 18.0 g/dL    Hematocrit 48.6 42.0 - 52.0 %    MCV 86.3 81.4 - 97.8 fL    MCH 29.1 27.0 - 33.0 pg    MCHC 33.7 33.7 - 35.3 g/dL    RDW 39.6 35.9 - 50.0 fL    Platelet Count 322 164 - 446 K/uL    MPV 9.6 9.0 - 12.9 fL    Neutrophils-Polys 81.40 (H) 44.00 - 72.00 %    Lymphocytes 12.40 (L) 22.00 - 41.00 %    Monocytes 5.30 0.00 - 13.40 %    Eosinophils 0.00 0.00 - 6.90 %    Basophils 0.40 0.00 - 1.80 %    Immature Granulocytes 0.50 0.00 - 0.90 %    Nucleated RBC 0.00 /100 WBC    Neutrophils (Absolute) 13.41 (H) 1.82 - 7.42 K/uL    Lymphs (Absolute) 2.04 1.00 - 4.80 K/uL    Monos (Absolute) 0.88 (H) 0.00 - 0.85 K/uL    Eos (Absolute) 0.00 0.00 - 0.51 K/uL    Baso (Absolute) 0.06 0.00 - 0.12 K/uL    Immature Granulocytes (abs) 0.08 0.00 - 0.11 K/uL    NRBC (Absolute) 0.00 K/uL   COMP METABOLIC PANEL   Result Value Ref Range    Sodium 138 135 - 145 mmol/L    Potassium 3.6 3.6 - 5.5 mmol/L    Chloride 99 96 - 112 mmol/L    Co2 23 20 - 33 mmol/L    Anion Gap 16.0 7.0 - 16.0    Glucose 101 (H) 65 - 99 mg/dL    Bun 14 8 - 22 mg/dL    Creatinine 0.65 0.50 - 1.40 mg/dL    Calcium 9.2 8.4 - 10.2 mg/dL    AST(SGOT) 24 12 - 45 U/L    ALT(SGPT) 22 2 - 50 U/L    Alkaline Phosphatase 88 30 - 99 U/L    Total Bilirubin 0.2 0.1 - 1.5 mg/dL    Albumin 4.7 3.2 - 4.9 g/dL    Total Protein 7.7 6.0 - 8.2 g/dL    Globulin 3.0 1.9 - 3.5 g/dL    A-G Ratio 1.6 g/dL   TROPONIN   Result Value Ref Range    Troponin T 7 6 - 19 ng/L   LIPASE   Result  Value Ref Range    Lipase 29 7 - 58 U/L   ACETAMINOPHEN   Result Value Ref Range    Acetaminophen -Tylenol <5.0 (L) 10.0 - 30.0 ug/mL   Salicylate   Result Value Ref Range    Salicylates, Quant. <1.0 (L) 15.0 - 25.0 mg/dL   TSH WITH REFLEX TO FT4   Result Value Ref Range    TSH 2.610 0.380 - 5.330 uIU/mL   ETHYL ALCOHOL (BLOOD)   Result Value Ref Range    Diagnostic Alcohol <10.1 0.0 - 10.0 mg/dL   LACTIC ACID   Result Value Ref Range    Lactic Acid 1.9 0.5 - 2.0 mmol/L   ESTIMATED GFR   Result Value Ref Range    GFR If African American >60 >60 mL/min/1.73 m 2    GFR If Non African American >60 >60 mL/min/1.73 m 2   EKG (NOW)   Result Value Ref Range    Report       [facility] Emergency Dept.    Test Date:  2022  Pt Name:    KEVIN RIZO                 Department: [facility]  MRN:        [id number]                      [facility]  Gender:     Male                         Technician: [id number]  :        2000                   Requested By:ISHMAEL VELAZQUEZ  Order #:    [id number]                    Reading MD:    Measurements  Intervals                                Axis  Rate:       78                           P:          59  WA:         145                          QRS:        93  QRSD:       99                           T:          55  QT:         393  QTc:        448    Interpretive Statements  Unknown rhythm, irregular rate  Borderline right axis deviation  ST elev, probable normal early repol pattern  Compared to ECG 2021 21:10:58  No significant changes          RADIOLOGY  The radiologist's interpretation of all radiological studies have been reviewed by me.     DX-CHEST-PORTABLE (1 VIEW)   Final Result      No evidence of acute cardiopulmonary process.      CT-HEAD W/O    (Results Pending)          COURSE & MEDICAL DECISION MAKING  Nursing notes, VS, PMSFHx reviewed in chart.     Review of past medical records shows the patient was last seen at [facility] on  February 24, 2022 for nausea and vomiting.  He was seen at [facility] ED on January 27, 2022 for psychiatric evaluation and medical clearance for psychiatric care.      Differential diagnoses considered include but are not limited to: Anxiety, depression, psychosis/schizophrenia, personality disorder, intoxication/overdose, encephalitis, electrolyte abnormality, thyroid disorder, endocrine dysfunction      History and physical exam as above.  On initial evaluation, patient was tremulous and shaky and did not answer many questions.  Record review shows that he has history of schizophrenia and unclear if he is taking his medications.  He was complaining of possible rash to the top of his foot.  Clinical exam does not seem to indicate apparent infection.  Patient was a rather poor historian and therefore work-up was initiated.  Initial EKG without significant change compared to prior.  Chest x-ray without evidence for acute abnormality.  Initial labs fairly unremarkable except for leukocytosis.  Patient will be signed out to the oncoming ED physician for further management and appropriate disposition.  Results for orders placed or performed during the hospital encounter of 03/08/22   CBC WITH DIFFERENTIAL   Result Value Ref Range    WBC 16.5 (H) 4.8 - 10.8 K/uL    RBC 5.63 4.70 - 6.10 M/uL    Hemoglobin 16.4 14.0 - 18.0 g/dL    Hematocrit 48.6 42.0 - 52.0 %    MCV 86.3 81.4 - 97.8 fL    MCH 29.1 27.0 - 33.0 pg    MCHC 33.7 33.7 - 35.3 g/dL    RDW 39.6 35.9 - 50.0 fL    Platelet Count 322 164 - 446 K/uL    MPV 9.6 9.0 - 12.9 fL    Neutrophils-Polys 81.40 (H) 44.00 - 72.00 %    Lymphocytes 12.40 (L) 22.00 - 41.00 %    Monocytes 5.30 0.00 - 13.40 %    Eosinophils 0.00 0.00 - 6.90 %    Basophils 0.40 0.00 - 1.80 %    Immature Granulocytes 0.50 0.00 - 0.90 %    Nucleated RBC 0.00 /100 WBC    Neutrophils (Absolute) 13.41 (H) 1.82 - 7.42 K/uL    Lymphs (Absolute) 2.04 1.00 - 4.80 K/uL    Monos (Absolute) 0.88 (H) 0.00 -  0.85 K/uL    Eos (Absolute) 0.00 0.00 - 0.51 K/uL    Baso (Absolute) 0.06 0.00 - 0.12 K/uL    Immature Granulocytes (abs) 0.08 0.00 - 0.11 K/uL    NRBC (Absolute) 0.00 K/uL   COMP METABOLIC PANEL   Result Value Ref Range    Sodium 138 135 - 145 mmol/L    Potassium 3.6 3.6 - 5.5 mmol/L    Chloride 99 96 - 112 mmol/L    Co2 23 20 - 33 mmol/L    Anion Gap 16.0 7.0 - 16.0    Glucose 101 (H) 65 - 99 mg/dL    Bun 14 8 - 22 mg/dL    Creatinine 0.65 0.50 - 1.40 mg/dL    Calcium 9.2 8.4 - 10.2 mg/dL    AST(SGOT) 24 12 - 45 U/L    ALT(SGPT) 22 2 - 50 U/L    Alkaline Phosphatase 88 30 - 99 U/L    Total Bilirubin 0.2 0.1 - 1.5 mg/dL    Albumin 4.7 3.2 - 4.9 g/dL    Total Protein 7.7 6.0 - 8.2 g/dL    Globulin 3.0 1.9 - 3.5 g/dL    A-G Ratio 1.6 g/dL   TROPONIN   Result Value Ref Range    Troponin T 7 6 - 19 ng/L   LIPASE   Result Value Ref Range    Lipase 29 7 - 58 U/L   ACETAMINOPHEN   Result Value Ref Range    Acetaminophen -Tylenol <5.0 (L) 10.0 - 30.0 ug/mL   Salicylate   Result Value Ref Range    Salicylates, Quant. <1.0 (L) 15.0 - 25.0 mg/dL   TSH WITH REFLEX TO FT4   Result Value Ref Range    TSH 2.610 0.380 - 5.330 uIU/mL   ETHYL ALCOHOL (BLOOD)   Result Value Ref Range    Diagnostic Alcohol <10.1 0.0 - 10.0 mg/dL   Group A Strep by PCR    Specimen: Throat   Result Value Ref Range    Group A Strep by PCR Not Detected Not Detected   LACTIC ACID   Result Value Ref Range    Lactic Acid 1.9 0.5 - 2.0 mmol/L   ESTIMATED GFR   Result Value Ref Range    GFR If African American >60 >60 mL/min/1.73 m 2    GFR If Non African American >60 >60 mL/min/1.73 m 2   URINALYSIS   Result Value Ref Range    Color Yellow     Character Clear     Specific Gravity 1.010 <1.035    Ph 7.5 5.0 - 8.0    Glucose Negative Negative mg/dL    Ketones Negative Negative mg/dL    Protein Negative Negative mg/dL    Bilirubin Negative Negative    Nitrite Negative Negative    Leukocyte Esterase Negative Negative    Occult Blood Negative Negative    Micro  Urine Req see below    URINE DRUG SCREEN   Result Value Ref Range    Amphetamines Urine Negative Negative    Barbiturates Negative Negative    Benzodiazepines Negative Negative    Cocaine Metabolite Negative Negative    Methadone Negative Negative    Opiates Negative Negative    Oxycodone Negative Negative    Phencyclidine -Pcp Negative Negative    Propoxyphene Negative Negative    Cannabinoid Metab Negative Negative   EKG (NOW)   Result Value Ref Range    Report       [facility] Emergency Dept.    Test Date:  2022  Pt Name:    KEVIN RIZO                 Department: [facility]  MRN:        [id number]                      Room:       -ROOM 3  Gender:     Male                         Technician: [id number]  :        2000                   Requested By:ISHMAEL VELAZQUEZ  Order #:    [id number]                    Reading MD: Ishmael Velazquez    Measurements  Intervals                                Axis  Rate:       78                           P:          59  KS:         145                          QRS:        93  QRSD:       99                           T:          55  QT:         393  QTc:        448    Interpretive Statements  Unknown rhythm, irregular rate  Borderline right axis deviation  ST elev, probable normal early repol pattern  Compared to ECG 2021 21:10:58  No significant changes  Electronically Signed On 3-8-2022 6:57:24 PST by Ishmael Velazquez         9:06 AM-care was transferred over to myself to follow-up on labs for medical clearance.  Head CT was obtained which was negative for any acute pathology.  The patient's white count came back at 16,000.  The patient is a very difficult historian-he answers yes to questions and then answers no to the same questions a minute later.  He does not seem to be responding to internal stimuli although he does not seem to be registering all of the conversation and/or questions appropriately.  He is currently on a legal hold.  His urinalysis is clear.   Chest x-ray is clear.  His rapid strep is negative.  Labs otherwise look reasonable in terms of his electrolytes.  Lactate is 1.9.  Given the elevated white count and altered mental status which appears to be somewhat different than his baseline schizophrenic related visits previously, I performed a lumbar puncture.  I tried to contact his father as an emergency contact but it appears that phone number is disabled.  The patient does not appear to be able to consent and so it was performed emergently without consent.    Lumbar puncture-the patient was prepped with Betadine and placed in a sitting position.  The L4-L5 interspace was identified and 2 attempts were made with a 18-gauge spinal needle.  Clear fluid return in 4 tubes were collected.  Sterile technique was used throughout the entire process.  The needle and stylet were removed and a Band-Aid was applied.  No complications.     10:26 AM-CSF came back demonstrating no evidence of meningitis/encephalitis.  I have placed a life skills consultation given the patient's history of schizophrenia and inability to care for self.  Patient will be medically cleared.  Note that the patient did complain of a rash to his feet-there is some redness to both of his feet volarly but there is no clinical evidence of cellulitis.  Patient entered into ED observation status at 10:28 AM.    12:44 PM-patient was evaluated by life skills who concur with the initial assessment that was the basis for the hold.  The patient will be transferred to an inpatient psychiatric facility and is currently on observation status pending that transfer.  The legal hold has been certified.      FINAL IMPRESSION  1. Confusion Active   2. History of schizophrenia Active   3. Homelessness Active          DISPOSITION  Pending life skills evaluation        OUTPATIENT MEDICATIONS  New Prescriptions    No medications on file          Electronically signed by: Aquilino Soni D.O., 3/8/2022 5:35  AM      Portions of this record were made with voice recognition software.  Despite my review, spelling/grammar/context errors may still remain.  Interpretation of this chart should be taken in this context.

## 2022-03-08 NOTE — ED NOTES
Ambulatory to the restroom with steady, independent gait.  Return to room.  No additional needs at this time.

## 2022-03-08 NOTE — ED NOTES
In reading the note from the ALERT team, I read that q15 min observation has been recommended.  I was unaware.  Will start them now.

## 2022-03-09 VITALS
TEMPERATURE: 98.8 F | WEIGHT: 180 LBS | BODY MASS INDEX: 24.41 KG/M2 | HEART RATE: 68 BPM | SYSTOLIC BLOOD PRESSURE: 131 MMHG | OXYGEN SATURATION: 96 % | DIASTOLIC BLOOD PRESSURE: 74 MMHG | RESPIRATION RATE: 15 BRPM

## 2022-03-09 LAB
BASOPHILS # BLD AUTO: 0.4 % (ref 0–1.8)
BASOPHILS # BLD: 0.04 K/UL (ref 0–0.12)
EOSINOPHIL # BLD AUTO: 0.19 K/UL (ref 0–0.51)
EOSINOPHIL NFR BLD: 2.1 % (ref 0–6.9)
ERYTHROCYTE [DISTWIDTH] IN BLOOD BY AUTOMATED COUNT: 42.3 FL (ref 35.9–50)
HCT VFR BLD AUTO: 45.3 % (ref 42–52)
HGB BLD-MCNC: 15.1 G/DL (ref 14–18)
IMM GRANULOCYTES # BLD AUTO: 0.02 K/UL (ref 0–0.11)
IMM GRANULOCYTES NFR BLD AUTO: 0.2 % (ref 0–0.9)
LYMPHOCYTES # BLD AUTO: 3.2 K/UL (ref 1–4.8)
LYMPHOCYTES NFR BLD: 35.8 % (ref 22–41)
MCH RBC QN AUTO: 29.2 PG (ref 27–33)
MCHC RBC AUTO-ENTMCNC: 33.3 G/DL (ref 33.7–35.3)
MCV RBC AUTO: 87.6 FL (ref 81.4–97.8)
MONOCYTES # BLD AUTO: 0.67 K/UL (ref 0–0.85)
MONOCYTES NFR BLD AUTO: 7.5 % (ref 0–13.4)
NEUTROPHILS # BLD AUTO: 4.81 K/UL (ref 1.82–7.42)
NEUTROPHILS NFR BLD: 54 % (ref 44–72)
NRBC # BLD AUTO: 0 K/UL
NRBC BLD-RTO: 0 /100 WBC
PLATELET # BLD AUTO: 331 K/UL (ref 164–446)
PMV BLD AUTO: 9.1 FL (ref 9–12.9)
RBC # BLD AUTO: 5.17 M/UL (ref 4.7–6.1)
WBC # BLD AUTO: 8.9 K/UL (ref 4.8–10.8)

## 2022-03-09 PROCEDURE — A9270 NON-COVERED ITEM OR SERVICE: HCPCS

## 2022-03-09 PROCEDURE — 700102 HCHG RX REV CODE 250 W/ 637 OVERRIDE(OP)

## 2022-03-09 PROCEDURE — 85025 COMPLETE CBC W/AUTO DIFF WBC: CPT

## 2022-03-09 RX ORDER — OLANZAPINE 5 MG/1
5 TABLET, ORALLY DISINTEGRATING ORAL EVERY EVENING
Status: DISCONTINUED | OUTPATIENT
Start: 2022-03-09 | End: 2022-03-09

## 2022-03-09 RX ORDER — OLANZAPINE 5 MG/1
5 TABLET, ORALLY DISINTEGRATING ORAL ONCE
Status: COMPLETED | OUTPATIENT
Start: 2022-03-09 | End: 2022-03-09

## 2022-03-09 RX ADMIN — OLANZAPINE 5 MG: 5 TABLET, ORALLY DISINTEGRATING ORAL at 06:45

## 2022-03-09 NOTE — ED NOTES
"Pt pacing room attempting to take off his clothes. States \"Im trying to take off\". reinformed patient he is on a legal hold and can not leave. Pt agreeable at this time.   "

## 2022-03-09 NOTE — ED PROVIDER NOTES
Dr Eric Duenas at [address] [place] [address]  11/13/17 4000 ED Provider Note    ED Observation Progress Note    Date of Service: 03/09/22    Interval History  Patient is currently waiting for transfer to [facility], they called us more concerned about the elevated white blood cell count 16.5 and asked for us to repeat this lab to make sure it is decreasing before  they can have a doctor accept the patient in transfer.  This was repeated and current white blood cell is 8.9.    Physical Exam  /74   Pulse 81   Temp 37.1 °C (98.8 °F) (Temporal)   Resp 18   Wt 81.6 kg (180 lb)   SpO2 97%   BMI 24.41 kg/m² .    Constitutional: Awake and alert. Nontoxic  HENT:  Grossly normal  Eyes: Grossly normal  Neck: Normal range of motion  Cardiovascular: Normal heart rate   Thorax & Lungs: No respiratory distress  Abdomen: Nontender  Skin:  No pathologic rash.   Extremities: Well perfused  Psychiatric: Affect normal    Labs  Results for orders placed or performed during the hospital encounter of 03/08/22   CBC WITH DIFFERENTIAL   Result Value Ref Range    WBC 16.5 (H) 4.8 - 10.8 K/uL    RBC 5.63 4.70 - 6.10 M/uL    Hemoglobin 16.4 14.0 - 18.0 g/dL    Hematocrit 48.6 42.0 - 52.0 %    MCV 86.3 81.4 - 97.8 fL    MCH 29.1 27.0 - 33.0 pg    MCHC 33.7 33.7 - 35.3 g/dL    RDW 39.6 35.9 - 50.0 fL    Platelet Count 322 164 - 446 K/uL    MPV 9.6 9.0 - 12.9 fL    Neutrophils-Polys 81.40 (H) 44.00 - 72.00 %    Lymphocytes 12.40 (L) 22.00 - 41.00 %    Monocytes 5.30 0.00 - 13.40 %    Eosinophils 0.00 0.00 - 6.90 %    Basophils 0.40 0.00 - 1.80 %    Immature Granulocytes 0.50 0.00 - 0.90 %    Nucleated RBC 0.00 /100 WBC    Neutrophils (Absolute) 13.41 (H) 1.82 - 7.42 K/uL    Lymphs (Absolute) 2.04 1.00 - 4.80 K/uL    Monos (Absolute) 0.88 (H) 0.00 - 0.85 K/uL    Eos (Absolute) 0.00 0.00 - 0.51 K/uL    Baso (Absolute) 0.06 0.00 - 0.12 K/uL    Immature Granulocytes (abs) 0.08 0.00 - 0.11 K/uL    NRBC (Absolute) 0.00 K/uL   COMP METABOLIC PANEL   Result Value Ref Range    Sodium 138  135 - 145 mmol/L    Potassium 3.6 3.6 - 5.5 mmol/L    Chloride 99 96 - 112 mmol/L    Co2 23 20 - 33 mmol/L    Anion Gap 16.0 7.0 - 16.0    Glucose 101 (H) 65 - 99 mg/dL    Bun 14 8 - 22 mg/dL    Creatinine 0.65 0.50 - 1.40 mg/dL    Calcium 9.2 8.4 - 10.2 mg/dL    AST(SGOT) 24 12 - 45 U/L    ALT(SGPT) 22 2 - 50 U/L    Alkaline Phosphatase 88 30 - 99 U/L    Total Bilirubin 0.2 0.1 - 1.5 mg/dL    Albumin 4.7 3.2 - 4.9 g/dL    Total Protein 7.7 6.0 - 8.2 g/dL    Globulin 3.0 1.9 - 3.5 g/dL    A-G Ratio 1.6 g/dL   TROPONIN   Result Value Ref Range    Troponin T 7 6 - 19 ng/L   LIPASE   Result Value Ref Range    Lipase 29 7 - 58 U/L   ACETAMINOPHEN   Result Value Ref Range    Acetaminophen -Tylenol <5.0 (L) 10.0 - 30.0 ug/mL   Salicylate   Result Value Ref Range    Salicylates, Quant. <1.0 (L) 15.0 - 25.0 mg/dL   TSH WITH REFLEX TO FT4   Result Value Ref Range    TSH 2.610 0.380 - 5.330 uIU/mL   ETHYL ALCOHOL (BLOOD)   Result Value Ref Range    Diagnostic Alcohol <10.1 0.0 - 10.0 mg/dL   Group A Strep by PCR    Specimen: Throat   Result Value Ref Range    Group A Strep by PCR Not Detected Not Detected   LACTIC ACID   Result Value Ref Range    Lactic Acid 1.9 0.5 - 2.0 mmol/L   ESTIMATED GFR   Result Value Ref Range    GFR If African American >60 >60 mL/min/1.73 m 2    GFR If Non African American >60 >60 mL/min/1.73 m 2   URINALYSIS   Result Value Ref Range    Color Yellow     Character Clear     Specific Gravity 1.010 <1.035    Ph 7.5 5.0 - 8.0    Glucose Negative Negative mg/dL    Ketones Negative Negative mg/dL    Protein Negative Negative mg/dL    Bilirubin Negative Negative    Nitrite Negative Negative    Leukocyte Esterase Negative Negative    Occult Blood Negative Negative    Micro Urine Req see below    URINE DRUG SCREEN   Result Value Ref Range    Amphetamines Urine Negative Negative    Barbiturates Negative Negative    Benzodiazepines Negative Negative    Cocaine Metabolite Negative Negative    Methadone Negative  Negative    Opiates Negative Negative    Oxycodone Negative Negative    Phencyclidine -Pcp Negative Negative    Propoxyphene Negative Negative    Cannabinoid Metab Negative Negative   CSF GLUCOSE   Result Value Ref Range    Glucose CSF 60 40 - 80 mg/dL   CSF PROTEIN   Result Value Ref Range    Total Protein, CSF 34 15 - 45 mg/dL   CSF Cell Count   Result Value Ref Range    Number Of Tubes 4     Volume 7.0 mL    Color-Body Fluid Colorless     Character-Body Fluid Clear     Supernatant Appearance Colorless     Total RBC Count 0 cells/uL    Crenated RBC 0 %    Total WBC Count 0 0 - 10 cells/uL    Lymphs 78 %    Mononuclear Cells - CSF 22 %    CSF Tube Number 3    GRAM STAIN    Specimen: CSF   Result Value Ref Range    Significant Indicator .     Source CSF     Site TAP     Gram Stain Result No organisms seen.  No  WBCs.      CBC WITH DIFFERENTIAL   Result Value Ref Range    WBC 8.9 4.8 - 10.8 K/uL    RBC 5.17 4.70 - 6.10 M/uL    Hemoglobin 15.1 14.0 - 18.0 g/dL    Hematocrit 45.3 42.0 - 52.0 %    MCV 87.6 81.4 - 97.8 fL    MCH 29.2 27.0 - 33.0 pg    MCHC 33.3 (L) 33.7 - 35.3 g/dL    RDW 42.3 35.9 - 50.0 fL    Platelet Count 331 164 - 446 K/uL    MPV 9.1 9.0 - 12.9 fL    Neutrophils-Polys 54.00 44.00 - 72.00 %    Lymphocytes 35.80 22.00 - 41.00 %    Monocytes 7.50 0.00 - 13.40 %    Eosinophils 2.10 0.00 - 6.90 %    Basophils 0.40 0.00 - 1.80 %    Immature Granulocytes 0.20 0.00 - 0.90 %    Nucleated RBC 0.00 /100 WBC    Neutrophils (Absolute) 4.81 1.82 - 7.42 K/uL    Lymphs (Absolute) 3.20 1.00 - 4.80 K/uL    Monos (Absolute) 0.67 0.00 - 0.85 K/uL    Eos (Absolute) 0.19 0.00 - 0.51 K/uL    Baso (Absolute) 0.04 0.00 - 0.12 K/uL    Immature Granulocytes (abs) 0.02 0.00 - 0.11 K/uL    NRBC (Absolute) 0.00 K/uL   EKG (NOW)   Result Value Ref Range    Report       [facility] Emergency Dept.    Test Date:  2022-03-08  Pt Name:    KEVIN RIZO                 Department: [facility]  MRN:        [id number]                       [facility]  Gender:     Male                         Technician: [id number]  :        2000                   Requested By:ISHMAEL VELAZQUEZ  Order #:    [id number]                    Reading MD: Ishmael Velazquez    Measurements  Intervals                                Axis  Rate:       78                           P:          59  KY:         145                          QRS:        93  QRSD:       99                           T:          55  QT:         393  QTc:        448    Interpretive Statements  Unknown rhythm, irregular rate  Borderline right axis deviation  ST elev, probable normal early repol pattern  Compared to ECG 2021 21:10:58  No significant changes  Electronically Signed On 3-8-2022 6:57:24 PST by Ishmael Velazquez         Radiology  CT-HEAD W/O   Final Result      No evidence of acute intracranial process.         DX-CHEST-PORTABLE (1 VIEW)   Final Result      No evidence of acute cardiopulmonary process.          Problem List  1. Schizophrenia        Electronically signed by: Pam Garcia M.D., 3/9/2022 11:59 AM

## 2022-03-09 NOTE — CONSULTS
"[facility] INPATIENT PSYCHIATRIC CONSULT LIAISON NOTE: INTAKE     DOS: 03/09/2022    Physician Requesting Consult: Dr. Carlos Crenshaw     REASON FOR CONSULT:  Legal Hold evaluation.      HPI: Pt is a 22 y/o male [facility] on legal hold after being incarcerated for an unclear reason. He was brought to the ED for psychosis. Pt disheveled, oriented x2-disoriented to time and situation, responding to internal stimuli, laughing inappropriately. Unable to answer behavioral health consult questions. Pt unable to care for himself due to acute psychosis. Hx of schizophrenia. Hx of multiple inpatient psychiatric hospitalizations.       Patient was seen as a new consult for legal hold evaluation. Patient is a poor history, looks confused, sometimes just stares. When asked why he is in the hospital he replied, with long pauses in between sentences \"I had a heart attack......I was walking outside..... I was striping.....\"  He inappropriately replied \"Nope, Yeap\" or \"Not yet\" at the same time to most questions asked. For today's date he replied \"Yeah\" Denies SI/HI/AVH though had to think about the answers for quite some time. He was noted to be responding to internal stimuli and smiling.  Denies Substance use. UDS is negative. Think he is 21 years. Per chart data, he is 21 years old. States he lives with his parents. Per chart review, he had earlier said he lives in a group home.       Discussed finding with consulting physician Dr. Carlos Crenshaw  Noted to be available via voalte/Text. No other provider on treatment team at this time.      Most of the information below is from chart review.    LEGAL HOLD: Legal hold     ALLERGIES:    NKDA     PAST MEDICAL HISTORY:  TBI     PAST PSYCHIATRIC HISTORY:   Schizophrenia. Was on Abilify per chart review     LEGAL HISTORY:  Was incarcerated for an unknown reason per chart review     SOCIAL HISTORY:  Social History            Tobacco Use   • Smoking status: Current Some " "Day Smoker       Types: Cigarettes   • Smokeless tobacco: Never Used   • Tobacco comment: Unknown amount   Vaping Use   • Vaping Use: Never used   Substance and Sexual Activity   • Alcohol use: Not Currently       Comment: \"A lot\"   • Drug use: Not Currently       Comment: denies   • Sexual activity: Never           CURRENT HOSPITAL PROBLEMS:   None         PSYCHIATRIC EXAMNIATION:  VITALS: WNL     MENTAL STATUS EXAM:  Appearance: Dressed in hospital gown, normal grooming and hygiene, no acute distress.   Attitude: Calm and cooperative.  Behavior: Resting in bed.   Musculoskeletal: able to move all extremities  Eye Contact: Adequate.  Speech: coherent, adequate volume  Affect: blunt  Mood: \"did not answer\"  Process: Unable to assess  Content: Denies, SI/HI though had to think about it for a little while  Perception: Denies auditory and visual hallucinations. Had to think about it for a little while. Noted to be responding to internal stimuli and smiling   Orientation: disoriented.  Memory: Disoriented  Insight: Poor     LABS:  Unremarkable     EKG: NSR    CURRENT PSYCHIATRIC MEDICATIONS:   Abilify 10mg daily (Per chart Review)     ASSESSMENT AND PLAN:   Schizophrenia- currently in acute episode    Continue Abilify 10mg daily     Legal Hold: Continue  Legal hold.    -Psych CL will continue following patient while at [facility].  -Medication reconciliation was completed.  -Reviewed safety plan - 911, ER, PCM, MHC, Suicide Crisis Line, Nursing staff while    inpatient.  -Visitors: Yes  -Personal Belongings: Yes  -Observation Level: Tele monitor    -Instruction: Transfer  to inpatient psych facility when medically cleared.    "

## 2022-03-09 NOTE — ED NOTES
Report from kristen FAY. Pt resting comfortably in [employer] [facility] [employer]. Discussed POC. Pt agreeable at this time.     Pt Q15 close obs.

## 2022-03-09 NOTE — ED NOTES
Pt pacing room, removing gown and brief. Removed his IV. Given new gown and clean brief, redirected back to the gurney and given a magazine to read.

## 2022-03-09 NOTE — DISCHARGE PLANNING
Alert Team:    Spoke with [facility] and informed they never received patient's referral. Faxed referral to facility for staffing.

## 2022-03-09 NOTE — DISCHARGE PLANNING
Alert Team Note:    Contacted [facility]. Pt packet has been received and is being reviewed. No beds available at this time.     Contacted [facility], spoke to michelle. Pt packet has not been received. Facility is having problems with their fax machine, and ask it be sent again once the fax is fixed.     [facility]

## 2022-03-09 NOTE — ED NOTES
Report received from NOC RN, POC discussed, walking rounds completed, pt awake in bed, Q15 min checks in place, no s/s distress, call light within reach and will continue to monitor.

## 2022-03-10 NOTE — ED NOTES
Call received from [facility]. [facility] to check with accepting doc and call back for acceptance. Report given to [facility] nurse

## 2022-03-10 NOTE — DISCHARGE PLANNING
[facility] ED Behavioral Health Fax Referral      [facility] ED Behavioral Health Alert Team:  [phone]    Referral: Legal Hold    Intervention: Patient referral to [facility] inpatient  facillity    Legal Hold Initiated: Date: 3/8/22 Time: 0610    Patient’s Insurance Listed on Face Sheet: [facility]    Referrals sent to: [facility]    Referrals faxed by Blaise Gallegos    This referral contains the following information:  1) Face sheet __x__  2) Page 1 and Page 2 of Legal Hold __x__  3) Alert Team Assessment/Psych Assessment __x__  4) Head to toe physical exam __x__  5) Urine Drug Screen __x__  6) Blood Alcohol ____  7) Vital signs __x__  8) Pregnancy test when applicable _n/a__  9) Medications list ____  10) Covid screening ____    Plan: Patient will transfer to mental health facility once acceptance is obtained

## 2022-03-10 NOTE — ED NOTES
Spoke with Hu at [facility] to arrange transport via [facility] to [facility]. Authorization # [id number] [id number]

## 2022-03-10 NOTE — ED PROVIDER NOTES
ED Observation Discharge Summary    Patient:Boaz Reis  Patient : 2000  Patient MRN: [id number]  Patient PCP: Pcp Pt States None    Admit Date: 3/8/2022  Discharge Date and Time: 03/10/22 1:28 AM  Discharge Diagnosis: Schizophrenia  Discharge Attending: Chrissy Winter M.D.  Discharge Service: ED Observation    ED Course  Boaz is a 21 y.o. male who was evaluated at [facility] emergency department, initially for evaluation of a rash on top of his feet, as well as altered mental status.  He was noted to have some abnormal behaviors, and was tremulous and shaky on arrival.  White blood count was elevated, lumbar puncture was performed without evidence of meningitis or encephalitis.  He was seen and evaluated by behavioral health RN who was in agreement with legal hold placement as he was found to be gravely disabled due to acute psychosis.  CBC was repeated, initially was found to have an elevated white blood count at 16.5 on arrival, this improved to 8.9.  He was accepted at [facility], and transferred to that facility for inpatient psychiatric care this evening.    Discharge Exam performed earlier in the day by Dr. Garcia:  /74   Pulse 68   Temp 37.1 °C (98.8 °F) (Temporal)   Resp 15   Wt 81.6 kg (180 lb)   SpO2 96%   BMI 24.41 kg/m² .    Constitutional: Awake and alert. Nontoxic  HENT:  Grossly normal  Eyes: Grossly normal  Neck: Normal range of motion  Cardiovascular: Normal heart rate   Thorax & Lungs: No respiratory distress  Abdomen: Nontender  Skin:  No pathologic rash.   Extremities: Well perfused  Psychiatric: Affect normal    Labs  All labs reviewed.  CSF culture with no growth at 24 hours.  White blood count 8.9 earlier today.    Radiology  All radiology results reviewed.  CT head without evidence of acute intercranial process.  Chest x-ray is negative for acute process.    Discharge Condition: Stable    Electronically signed by: Chrissy Winter M.D., 3/10/2022  1:33 AM

## 2022-03-10 NOTE — ED NOTES
[facility] called for acceptance. Accepting Dr. Rodriguez. MIAH WARNER aware.  to set up transport through [facility].

## 2022-03-10 NOTE — DISCHARGE PLANNING
Alert Team Note:    Contacted [facility], spoke to Callie. Pt has been declined due to his primary insurance being out of network. The facility is not able to bill his secondary insurance.

## 2022-03-11 LAB
BACTERIA CSF CULT: NORMAL
GRAM STN SPEC: NORMAL
SIGNIFICANT IND 70042: NORMAL
SITE SITE: NORMAL
SOURCE SOURCE: NORMAL

## 2023-03-03 ENCOUNTER — HOSPITAL ENCOUNTER (EMERGENCY)
Facility: MEDICAL CENTER | Age: 23
End: 2023-03-03
Attending: EMERGENCY MEDICINE
Payer: COMMERCIAL

## 2023-03-03 ENCOUNTER — APPOINTMENT (OUTPATIENT)
Dept: RADIOLOGY | Facility: MEDICAL CENTER | Age: 23
End: 2023-03-03
Attending: EMERGENCY MEDICINE
Payer: COMMERCIAL

## 2023-03-03 VITALS
SYSTOLIC BLOOD PRESSURE: 122 MMHG | RESPIRATION RATE: 16 BRPM | BODY MASS INDEX: 23.26 KG/M2 | HEIGHT: 72 IN | TEMPERATURE: 97.2 F | OXYGEN SATURATION: 98 % | HEART RATE: 78 BPM | DIASTOLIC BLOOD PRESSURE: 71 MMHG | WEIGHT: 171.74 LBS

## 2023-03-03 DIAGNOSIS — L03.039 CELLULITIS OF GREAT TOE, UNSPECIFIED LATERALITY: ICD-10-CM

## 2023-03-03 PROCEDURE — 99282 EMERGENCY DEPT VISIT SF MDM: CPT

## 2023-03-03 RX ORDER — CLINDAMYCIN HYDROCHLORIDE 150 MG/1
150 CAPSULE ORAL 3 TIMES DAILY
Qty: 30 CAPSULE | Refills: 0 | Status: SHIPPED | OUTPATIENT
Start: 2023-03-03 | End: 2023-03-03 | Stop reason: SDUPTHER

## 2023-03-03 RX ORDER — CLINDAMYCIN HYDROCHLORIDE 150 MG/1
300 CAPSULE ORAL 3 TIMES DAILY
Qty: 30 CAPSULE | Refills: 0 | Status: ACTIVE | OUTPATIENT
Start: 2023-03-03

## 2023-03-03 ASSESSMENT — FIBROSIS 4 INDEX: FIB4 SCORE: 0.36

## 2023-03-04 NOTE — ED TRIAGE NOTES
"Boaz Reis  Chief Complaint   Patient presents with    Toe Pain     Pt says his toe \"got squished\" at [place] on Tuesday. Pt was trespassing at [employer], told police he had to come here to get seen vs going to penitentiary, however pt is not in custody. L great toe.      22M to triage via EMS for above complaint.      Pt is alert and oriented, speaking in full sentences, follows commands and responds appropriately to questions. NAD. Resp are even and unlabored.      Pt placed in lobby and educated on triage process. Pt encouraged to alert staff for any changes.     Patient and staff wearing appropriate PPE    BP (!) 137/95   Pulse 79   Temp 36.4 °C (97.6 °F) (Temporal)   Resp 18   Ht 1.829 m (6')   Wt 77.9 kg (171 lb 11.8 oz)   SpO2 99%   BMI 23.29 kg/m²       "

## 2023-03-04 NOTE — ED PROVIDER NOTES
"ED Provider Note    CHIEF COMPLAINT  Chief Complaint   Patient presents with    Toe Pain     Pt says his toe \"got squished\" at [place] on Tuesday. Pt was trespassing at [employer], told police he had to come here to get seen vs going to [facility], however pt is not in custody. L great toe.        EXTERNAL RECORDS REVIEWED  None    HPI/ROS  LIMITATION TO HISTORY   None  OUTSIDE HISTORIAN(S):  None    Boaz Reis is a 22 y.o. male who presents here for evaluation of left toe pain.  Patient states that he has been having some toe pain for the last few weeks, after he was \"squished\" at [place].  He is unable to tell me what fell onto his toe, and if he has been having any actual pain.  Patient was given the option to come to the hospital to have his toe looked at, or go to [facility], Raffy to come here.  He has no trouble walking, no vomiting, fever or chills.  The left toe is red, and mildly tender to palpation.  Patient is refusing x-ray, and states that he just wants antibiotics.  He has no other medical concerns at this time.    PAST MEDICAL HISTORY   has a past medical history of Schizophrenia (HCC) and TBI (traumatic brain injury).    SURGICAL HISTORY  patient denies any surgical history    FAMILY HISTORY  Family History   Problem Relation Age of Onset    Hypertension Father        SOCIAL HISTORY  Social History     Tobacco Use    Smoking status: Some Days     Types: Cigarettes    Smokeless tobacco: Never    Tobacco comments:     Unknown amount   Vaping Use    Vaping Use: Never used   Substance and Sexual Activity    Alcohol use: Not Currently     Comment: \"A lot\"    Drug use: Not Currently     Comment: denies    Sexual activity: Never       CURRENT MEDICATIONS  Home Medications    **Home medications have not yet been reviewed for this encounter**         ALLERGIES  No Known Allergies    PHYSICAL EXAM  VITAL SIGNS: /71   Pulse 78   Temp 36.2 °C (97.2 °F)   Resp 16   Ht 1.829 m (6')   Wt 77.9 kg (171 " lb 11.8 oz)   SpO2 98%   BMI 23.29 kg/m²    Constitutional: Disheveled, unkempt  HEENT: Normocephalic, atraumatic. Posterior pharynx clear and moist.  Eyes:  EOMI. Normal sclera.  Musculoskeletal: No deformity, no edema, neurovascular intact.  Left toe with mild erythema of the lateral aspect of the nailbed.  Neurovascular tact distally.  Neuro: Clear speech, appropriate, cooperative, cranial nerves II-XII grossly intact.  Psych: Normal mood and affect      DIAGNOSTIC STUDIES / PROCEDURES  None    RADIOLOGY  Patient refused    COURSE & MEDICAL DECISION MAKING      INITIAL ASSESSMENT, COURSE AND PLAN  Care Narrative: This is a 22-year-old male.  He was brought here for left toe pain, and then when asked if he would like his toe evaluated, he declined, and stated that he did not want an x-ray.  I told him it was red around his toe, and he preferred just to have antibiotics.  He does not want any other treatment.  He is nontoxic-appearing, afebrile and comfortable.            DISPOSITION AND DISCUSSIONS  I have discussed management of the patient with the following physicians and CASSIE's: Pharmacy    Discussion of management with other Q or appropriate source(s): None    Escalation of care considered, and ultimately not performed: None    Barriers to care at this time, including but not limited to: Patient does not have established PCP.     Decision tools and prescription drugs considered including, but not limited to: Antibiotics clindamycin .    FINAL DIAGNOSIS  1. Cellulitis of great toe, unspecified laterality           Electronically signed by: Keagan Holguin D.O., 3/3/2023 10:35 PM

## 2023-03-04 NOTE — ED NOTES
Patient ambulated to room barefoot without any difficulty at a steady normal pace without a limp. Once in room patient states that his left great toe hurts.

## 2024-05-22 NOTE — ED NOTES
Patient given a meal    Pt. Phone  number incorrect-left message on spouse number (on HIPAA) to have call back to discuss refills-pt needs appt-

## 2024-07-16 ENCOUNTER — HOSPITAL ENCOUNTER (EMERGENCY)
Facility: MEDICAL CENTER | Age: 24
End: 2024-07-16
Attending: STUDENT IN AN ORGANIZED HEALTH CARE EDUCATION/TRAINING PROGRAM
Payer: COMMERCIAL

## 2024-07-16 VITALS
RESPIRATION RATE: 18 BRPM | DIASTOLIC BLOOD PRESSURE: 72 MMHG | HEIGHT: 72 IN | BODY MASS INDEX: 23.03 KG/M2 | SYSTOLIC BLOOD PRESSURE: 120 MMHG | TEMPERATURE: 97.6 F | WEIGHT: 170 LBS | HEART RATE: 86 BPM | OXYGEN SATURATION: 96 %

## 2024-07-16 DIAGNOSIS — L85.3 DRY SKIN DERMATITIS: ICD-10-CM

## 2024-07-16 PROCEDURE — 99283 EMERGENCY DEPT VISIT LOW MDM: CPT

## 2024-07-16 ASSESSMENT — FIBROSIS 4 INDEX: FIB4 SCORE: 0.32

## 2024-08-08 NOTE — NUR
Pt report to Marcelle crenshaw. Writer called patient to schedule annual exam in next 6 months per Dr. West's request.  Left a voicemail.

## 2024-09-04 ENCOUNTER — APPOINTMENT (OUTPATIENT)
Dept: RADIOLOGY | Facility: MEDICAL CENTER | Age: 24
End: 2024-09-04
Attending: STUDENT IN AN ORGANIZED HEALTH CARE EDUCATION/TRAINING PROGRAM
Payer: COMMERCIAL

## 2024-09-04 ENCOUNTER — HOSPITAL ENCOUNTER (EMERGENCY)
Facility: MEDICAL CENTER | Age: 24
End: 2024-09-04
Attending: STUDENT IN AN ORGANIZED HEALTH CARE EDUCATION/TRAINING PROGRAM
Payer: COMMERCIAL

## 2024-09-04 ENCOUNTER — HOSPITAL ENCOUNTER (EMERGENCY)
Facility: MEDICAL CENTER | Age: 24
End: 2024-09-04
Attending: EMERGENCY MEDICINE
Payer: COMMERCIAL

## 2024-09-04 ENCOUNTER — PHARMACY VISIT (OUTPATIENT)
Dept: PHARMACY | Facility: MEDICAL CENTER | Age: 24
End: 2024-09-04
Payer: COMMERCIAL

## 2024-09-04 VITALS
BODY MASS INDEX: 23.03 KG/M2 | DIASTOLIC BLOOD PRESSURE: 80 MMHG | SYSTOLIC BLOOD PRESSURE: 147 MMHG | HEIGHT: 72 IN | OXYGEN SATURATION: 96 % | HEART RATE: 81 BPM | TEMPERATURE: 97.4 F | RESPIRATION RATE: 15 BRPM | WEIGHT: 170 LBS

## 2024-09-04 VITALS
RESPIRATION RATE: 17 BRPM | SYSTOLIC BLOOD PRESSURE: 151 MMHG | WEIGHT: 170 LBS | HEART RATE: 103 BPM | TEMPERATURE: 99.7 F | BODY MASS INDEX: 23.03 KG/M2 | HEIGHT: 72 IN | DIASTOLIC BLOOD PRESSURE: 73 MMHG | OXYGEN SATURATION: 96 %
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
## 2024-09-04 DIAGNOSIS — S01.81XA FACIAL LACERATION, INITIAL ENCOUNTER: ICD-10-CM

## 2024-09-04 DIAGNOSIS — Z59.00 HOMELESSNESS: ICD-10-CM

## 2024-09-04 DIAGNOSIS — F20.81 SCHIZOPHRENIFORM DISORDER (HCC): ICD-10-CM

## 2024-09-04 LAB
AMPHET UR QL SCN: NEGATIVE
BARBITURATES UR QL SCN: NEGATIVE
BENZODIAZ UR QL SCN: NEGATIVE
BZE UR QL SCN: NEGATIVE
CANNABINOIDS UR QL SCN: NEGATIVE
FENTANYL UR QL: NEGATIVE
METHADONE UR QL SCN: NEGATIVE
OPIATES UR QL SCN: NEGATIVE
OXYCODONE UR QL SCN: NEGATIVE
PCP UR QL SCN: NEGATIVE
POC BREATHALIZER: 0 PERCENT (ref 0–0.01)
PROPOXYPH UR QL SCN: NEGATIVE

## 2024-09-04 PROCEDURE — 99284 EMERGENCY DEPT VISIT MOD MDM: CPT

## 2024-09-04 PROCEDURE — 302970 POC BREATHALIZER: Performed by: EMERGENCY MEDICINE

## 2024-09-04 PROCEDURE — 80307 DRUG TEST PRSMV CHEM ANLYZR: CPT

## 2024-09-04 PROCEDURE — A9270 NON-COVERED ITEM OR SERVICE: HCPCS | Mod: UD | Performed by: EMERGENCY MEDICINE

## 2024-09-04 PROCEDURE — 700102 HCHG RX REV CODE 250 W/ 637 OVERRIDE(OP): Mod: UD | Performed by: EMERGENCY MEDICINE

## 2024-09-04 PROCEDURE — RXMED WILLOW AMBULATORY MEDICATION CHARGE: Performed by: EMERGENCY MEDICINE

## 2024-09-04 PROCEDURE — 99285 EMERGENCY DEPT VISIT HI MDM: CPT

## 2024-09-04 PROCEDURE — 90791 PSYCH DIAGNOSTIC EVALUATION: CPT

## 2024-09-04 RX ORDER — OLANZAPINE 10 MG/1
10 TABLET ORAL NIGHTLY
Qty: 30 TABLET | Refills: 0 | Status: SHIPPED | OUTPATIENT
Start: 2024-09-04

## 2024-09-04 RX ORDER — OLANZAPINE 5 MG/1
5 TABLET ORAL ONCE
Status: COMPLETED | OUTPATIENT
Start: 2024-09-04 | End: 2024-09-04

## 2024-09-04 RX ORDER — LIDOCAINE HYDROCHLORIDE AND EPINEPHRINE 10; 10 MG/ML; UG/ML
20 INJECTION, SOLUTION INFILTRATION; PERINEURAL ONCE
Status: DISCONTINUED | OUTPATIENT
Start: 2024-09-04 | End: 2024-09-05 | Stop reason: HOSPADM

## 2024-09-04 RX ADMIN — OLANZAPINE 5 MG: 5 TABLET, FILM COATED ORAL at 13:39

## 2024-09-04 SDOH — ECONOMIC STABILITY - HOUSING INSECURITY: HOMELESSNESS UNSPECIFIED: Z59.00

## 2024-09-04 ASSESSMENT — FIBROSIS 4 INDEX
FIB4 SCORE: 0.32
FIB4 SCORE: 0.32

## 2024-09-04 ASSESSMENT — PAIN DESCRIPTION - PAIN TYPE: TYPE: ACUTE PAIN
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
## 2024-09-04 NOTE — DISCHARGE INSTRUCTIONS
These take Zyprexa as prescribed.  Follow-up with the [place] [facility] for further evaluation and treatment.  Return for any change or worsening symptoms

## 2024-09-04 NOTE — ED PROVIDER NOTES
ER Provider Note    Scribed for Reza Ko D.O. by Renan Ryan. 9/4/2024  1:11 PM    Primary Care Provider: Pcp Pt States None    CHIEF COMPLAINT  Chief Complaint   Patient presents with    Psych Eval       HPI/ROS  LIMITATION TO HISTORY   None.     OUTSIDE HISTORIAN(S):  None.     Boaz Reis is a 23 y.o. male with a history of schizophrenia who presents to the Emergency Department for a psychiatric evaluation. Patient was recently incarcerated for two days, and left the half-way today. Patient prescribed haldol and Olanzapine for his schizophrenia, but states that he has not been taking these medications for the past two months. He is homeless at this time, and currently would like to become employed so that he is able to get a home. He has stayed at shelters in the past. He denies any recent drug or alcohol use.     ROS as per HPI.    PAST MEDICAL HISTORY  Past Medical History:   Diagnosis Date    Schizophrenia (HCC)     TBI (traumatic brain injury) (HCC)        SURGICAL HISTORY  None known at this time.     FAMILY HISTORY  Family History   Problem Relation Age of Onset    Hypertension Father        SOCIAL HISTORY   reports that he has been smoking cigarettes. He has never used smokeless tobacco. He reports that he does not currently use alcohol. He reports that he does not currently use drugs.    CURRENT MEDICATIONS  Discharge Medication List as of 9/4/2024  3:49 PM        CONTINUE these medications which have NOT CHANGED    Details   clindamycin (CLEOCIN) 150 MG Cap Take 2 Capsules by mouth 3 times a day., Disp-30 Capsule, R-0, Normal             ALLERGIES  Patient has no known allergies.    PHYSICAL EXAM  BP (!) 149/72   Pulse 62   Temp 36.2 °C (97.2 °F) (Temporal)   Resp 20   Ht 1.829 m (6')   Wt 77.1 kg (170 lb)   SpO2 97%   BMI 23.06 kg/m²     General: No acute distress.  HENT: Normocephalic, Mucus membranes are moist.   Chest: Lungs have even and unlabored respirations, Clear to auscultation.    Cardiovascular: Regular rate and regular rhythm, No peripheral cyanosis.  Abdomen: Non distended.  Neuro: Awake, Conversive, Able to relay recent events.  Psychiatric: Calm and cooperative. Mild rambled speech but is answering quesitons appropriately.     EXTERNAL RECORDS REVIEWED  Review of patient's past medical records show multiple visits to [facility] for psychosis and schizophrenia, where he is seen about 3-4 times per month.     INITIAL ASSESSMENT  Patient has a history of homelessness and was recently released from detention and has no plan to find a place to live. He has been seen in the ED multiple times for psychosis. Has been non compliant with medications. Will give first dose of Zyprexa here. At this time I do not suspect appropriate criteria for legal 2000, will have evaluation by alert team.     ED Observation Status? No; Patient does not meet criteria for ED Observation.     DIAGNOSTIC STUDIES    Labs:   Results for orders placed or performed during the hospital encounter of 09/04/24   URINE DRUG SCREEN   Result Value Ref Range    Amphetamines Urine Negative Negative    Barbiturates Negative Negative    Benzodiazepines Negative Negative    Cocaine Metabolite Negative Negative    Fentanyl, Urine Negative Negative    Methadone Negative Negative    Opiates Negative Negative    Oxycodone Negative Negative    Phencyclidine -Pcp Negative Negative    Propoxyphene Negative Negative    Cannabinoid Metab Negative Negative   POC BREATHALIZER   Result Value Ref Range    POC Breathalizer 0.0 0.00 - 0.01 Percent        COURSE & MEDICAL DECISION MAKING     COURSE AND PLAN  1:11 PM - Patient seen and examined at bedside. Patient arrives today after being recently incarcerated for a psychiatric evaluation. He has not been taking his medications for the past two months, and is currently homeless. Discussed plan of care, including holding him in the ED until an appropriate disposition plan is achieved. Patient agrees to  the plan of care. The patient will be medicated with Zyprexa 5 mg. Ordered for Urine drug screen and POC breathalizer to evaluate his symptoms.     2:52 PM - I spoke with the alert team, and the patient does not meet the criteria for legal team housing. I discussed plan for discharge and follow up as outlined below. The patient is stable for discharge at this time and will return for any new or worsening symptoms. Patient verbalizes understanding and support with my plan for discharge.      ED Summary: Patient has a history of schizophrenia, he is homeless, just got out of residential.  He has been able to care for himself is a homeless individual for some time now is not suicidal not homicidal he is able to form rational thoughts.  He was given Zyprexa as a first dose here he is discharged home with Zyprexa.    Decision tools and prescription drugs considered including, but not limited to: Zyrexa 10 mg tablet      DISPOSITION AND DISCUSSIONS    Discussion of management with other [facility] or appropriate source(s): Behavioral health and Alert team.     Barriers to care at this time, including but not limited to: Patient does not have an established PCP, He is homeless, He lacks financial recourses, and he has difficulty affording medications.       The patient will return for new or worsening symptoms and is stable at the time of discharge.    DISPOSITION:  Patient will be discharged home in stable condition.    FOLLOW UP:  [facility] [address]  [facility] [address]  [phone]  In 1 week        OUTPATIENT MEDICATIONS:  Discharge Medication List as of 9/4/2024  3:49 PM        START taking these medications    Details   olanzapine (ZYPREXA) 10 MG tablet Take 1 Tablet by mouth every evening.Please deliver his medications to the patient's bedsideDisp-30 Tablet, R-0, Normal              FINAL DIAGNOSIS  1. Schizophreniform disorder (HCC)    2. Homelessness        I, Renan Ryan (Srinivasibana maria), am scribing for,  and in the presence of, Reza Ko D.O..    Electronically signed by: Renan Ryan (Scribe), 9/4/2024    I, Reza Ko D.O. personally performed the services described in this documentation, as scribed by Renan Ryan in my presence, and it is both accurate and complete.     The note accurately reflects work and decisions made by me.  Reza Ko D.O.  9/4/2024  6:46 PM

## 2024-09-04 NOTE — ED TRIAGE NOTES
Chief Complaint   Patient presents with    Psych Eval       22 yo M BIB PD on legal hold. Pt has been in correction since 8/20, pt was being released today but placed on L2K by correction staff fr concerns of inability to care for self. On arrival to ED pt is rambling but able to participate with most of the triage questioning. Pt complaining of chronic headache. Pt denies SI/HI.    Hx of TBI and schizophrenia, unsure of home medications.     Pt changed into paper scrubs and all belongings removed from room. 1:1 sitter at bedside.

## 2024-09-04 NOTE — ED NOTES
Medications picked up from pharmacy. Pt ambulatory to BR with steady gait to change back into personal clothing. Alert team arranging MTM on behalf on pt. Pt provided discharge instructions and follow-up information. Pt verbalized understanding and all questions answered. Pt ambulated from ED with steady gait carrying all belongings.
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
## 2024-09-05 NOTE — ED NOTES
Bed alarm going off, pt found turning bed alarm off. Pt reminded he needs to call for assistance and he can not be turning equipment off, pt verbalized understanding. Denied any needs, pt back to bed.

## 2024-09-05 NOTE — ED NOTES
Bed alarm going off, pt found turning bed alarm off. Pt reminded he needs to call for assistance and he can not be turning equipment off, pt verbalized understanding. Distraction provided for pt. Pt back to bed, denied any needs. Pt verbalized understanding on need to call staff for assistance before getting out of bed.

## 2024-09-05 NOTE — ED NOTES
"Bed alarm going off, pt found turning bed alarm off. Pt reminded he needs to call for assistance and he can not be turning equipment off. Pt verbally aggressive towards staff, stating he wants to leave. ASHLEY Murillo called to the bedside. This RN attempting to educate pt on POC and risks associated with leaving hospital before necessary tests are performed. Pt shouting out, \"what are you looking at you bitch.\" Security also called to the bedside.     2146: ERP at bedside. Pt refused teachings and treatment offered by ERP. Pt offered wound irrigating but declined and began walking towards the exit. Pt refused to sign AMA form. Pt escorted out by security.   "

## 2024-09-28 ENCOUNTER — HOSPITAL ENCOUNTER (EMERGENCY)
Facility: MEDICAL CENTER | Age: 24
End: 2024-09-28
Attending: EMERGENCY MEDICINE
Payer: COMMERCIAL

## 2024-09-28 ENCOUNTER — APPOINTMENT (OUTPATIENT)
Dept: RADIOLOGY | Facility: MEDICAL CENTER | Age: 24
End: 2024-09-28
Attending: EMERGENCY MEDICINE
Payer: COMMERCIAL

## 2024-09-28 VITALS
BODY MASS INDEX: 25.77 KG/M2 | RESPIRATION RATE: 18 BRPM | HEART RATE: 91 BPM | WEIGHT: 180 LBS | OXYGEN SATURATION: 97 % | DIASTOLIC BLOOD PRESSURE: 68 MMHG | BODY MASS INDEX: 25.77 KG/M2 | RESPIRATION RATE: 18 BRPM | HEART RATE: 91 BPM | SYSTOLIC BLOOD PRESSURE: 130 MMHG | HEIGHT: 70 IN | DIASTOLIC BLOOD PRESSURE: 68 MMHG | SYSTOLIC BLOOD PRESSURE: 130 MMHG | TEMPERATURE: 97.8 F | HEIGHT: 70 IN | OXYGEN SATURATION: 97 % | WEIGHT: 180 LBS | TEMPERATURE: 97.8 F

## 2024-09-28 VITALS
WEIGHT: 170 LBS | HEART RATE: 78 BPM | TEMPERATURE: 98.4 F | HEIGHT: 71 IN | SYSTOLIC BLOOD PRESSURE: 140 MMHG | BODY MASS INDEX: 23.8 KG/M2 | OXYGEN SATURATION: 98 % | RESPIRATION RATE: 17 BRPM | DIASTOLIC BLOOD PRESSURE: 66 MMHG

## 2024-09-28 DIAGNOSIS — S01.91XA COMPLEX LACERATION OF FACE, INITIAL ENCOUNTER: ICD-10-CM

## 2024-09-28 DIAGNOSIS — F10.920 ALCOHOLIC INTOXICATION WITHOUT COMPLICATION (HCC): ICD-10-CM

## 2024-09-28 DIAGNOSIS — S01.80XA OPEN WOUND OF FACE, INITIAL ENCOUNTER: ICD-10-CM

## 2024-09-28 DIAGNOSIS — S09.90XA CLOSED HEAD INJURY, INITIAL ENCOUNTER: ICD-10-CM

## 2024-09-28 PROCEDURE — 304217 HCHG IRRIGATION SYSTEM

## 2024-09-28 PROCEDURE — 303747 HCHG EXTRA SUTURE

## 2024-09-28 PROCEDURE — 305948 HCHG GREEN TRAUMA ACT PRE-NOTIFY NO CC

## 2024-09-28 PROCEDURE — 99284 EMERGENCY DEPT VISIT MOD MDM: CPT

## 2024-09-28 PROCEDURE — 304995 HCHG REPAIR-COMPLEX-NEEL 2.6-7.5 CM

## 2024-09-28 PROCEDURE — 700101 HCHG RX REV CODE 250: Performed by: EMERGENCY MEDICINE

## 2024-09-28 PROCEDURE — 71045 X-RAY EXAM CHEST 1 VIEW: CPT

## 2024-09-28 PROCEDURE — 70450 CT HEAD/BRAIN W/O DYE: CPT

## 2024-09-28 PROCEDURE — 72170 X-RAY EXAM OF PELVIS: CPT

## 2024-09-28 PROCEDURE — 72125 CT NECK SPINE W/O DYE: CPT

## 2024-09-28 RX ORDER — LIDOCAINE HYDROCHLORIDE AND EPINEPHRINE BITARTRATE 20; .01 MG/ML; MG/ML
10 INJECTION, SOLUTION SUBCUTANEOUS ONCE
Status: COMPLETED | OUTPATIENT
Start: 2024-09-28 | End: 2024-09-28

## 2024-09-28 RX ADMIN — LIDOCAINE HYDROCHLORIDE,EPINEPHRINE BITARTRATE 10 ML: 20; .01 INJECTION, SOLUTION INFILTRATION; PERINEURAL at 15:00

## 2024-09-28 ASSESSMENT — FIBROSIS 4 INDEX: FIB4 SCORE: 0.32
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
## 2024-09-28 NOTE — ED NOTES
Patient requested to use restroom. Patient in restroom in shower when rounding on patient. Patient educated on need to finish shower and given clothing to change into.

## 2024-09-28 NOTE — ED NOTES
IV removed. Discharge paperwork reviewed with patient. Patient refused to sign dc paperwork states he needs to take all paperwork with him.
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
## 2024-09-29 NOTE — ED NOTES
Went to clean blood off pt face and as soon as I got close to his eye he told me to stop with an agitated voice. Stopped cleaning pt face. Asked if he would allow me to put lube on the dried blood to help soften it so it would clean off better. He accepted. Applied lube to the eyebrow. Will wait to clean off pt until dried blood has softened.

## 2024-09-29 NOTE — ED NOTES
Pt discharged to home. Discharge paperwork provided. Education provided by ERP. Reinforced discharge instructions.  Pt was given follow up instructions  Pt verbalized understanding of all instructions for discharge.   Patient went out of the ER ambulatory with steady gait escorted by security., alert and oriented x 4, with all belongings.

## 2024-09-29 NOTE — ED NOTES
Per PD; their legal time is up and they can no longer stay with the patient and once patient id for d/c he can just go.

## 2024-10-04 ENCOUNTER — HOSPITAL ENCOUNTER (EMERGENCY)
Facility: MEDICAL CENTER | Age: 24
End: 2024-10-05
Payer: COMMERCIAL

## 2024-10-04 VITALS
OXYGEN SATURATION: 100 % | TEMPERATURE: 97.5 F | WEIGHT: 175.93 LBS | DIASTOLIC BLOOD PRESSURE: 73 MMHG | BODY MASS INDEX: 24.63 KG/M2 | HEART RATE: 85 BPM | HEIGHT: 71 IN | RESPIRATION RATE: 16 BRPM | SYSTOLIC BLOOD PRESSURE: 117 MMHG

## 2024-10-04 PROCEDURE — 302449 STATCHG TRIAGE ONLY (STATISTIC)

## 2024-10-04 ASSESSMENT — FIBROSIS 4 INDEX: FIB4 SCORE: 0.32
